# Patient Record
Sex: MALE | Race: WHITE | NOT HISPANIC OR LATINO | Employment: OTHER | URBAN - METROPOLITAN AREA
[De-identification: names, ages, dates, MRNs, and addresses within clinical notes are randomized per-mention and may not be internally consistent; named-entity substitution may affect disease eponyms.]

---

## 2017-11-18 ENCOUNTER — APPOINTMENT (OUTPATIENT)
Dept: RADIOLOGY | Facility: MEDICAL CENTER | Age: 82
DRG: 481 | End: 2017-11-18
Attending: EMERGENCY MEDICINE
Payer: MEDICARE

## 2017-11-18 ENCOUNTER — HOSPITAL ENCOUNTER (INPATIENT)
Facility: MEDICAL CENTER | Age: 82
LOS: 4 days | DRG: 481 | End: 2017-11-22
Attending: EMERGENCY MEDICINE | Admitting: HOSPITALIST
Payer: MEDICARE

## 2017-11-18 ENCOUNTER — RESOLUTE PROFESSIONAL BILLING HOSPITAL PROF FEE (OUTPATIENT)
Dept: HOSPITALIST | Facility: MEDICAL CENTER | Age: 82
End: 2017-11-18
Payer: MEDICARE

## 2017-11-18 ENCOUNTER — APPOINTMENT (OUTPATIENT)
Dept: RADIOLOGY | Facility: MEDICAL CENTER | Age: 82
DRG: 481 | End: 2017-11-18
Attending: ORTHOPAEDIC SURGERY
Payer: MEDICARE

## 2017-11-18 DIAGNOSIS — C90.00 MULTIPLE MYELOMA NOT HAVING ACHIEVED REMISSION (HCC): ICD-10-CM

## 2017-11-18 DIAGNOSIS — S72.92XA CLOSED FRACTURE OF LEFT FEMUR, UNSPECIFIED FRACTURE MORPHOLOGY, UNSPECIFIED PORTION OF FEMUR, INITIAL ENCOUNTER (HCC): ICD-10-CM

## 2017-11-18 DIAGNOSIS — Z85.79 HISTORY OF MULTIPLE MYELOMA: ICD-10-CM

## 2017-11-18 DIAGNOSIS — S72.22XA CLOSED DISPLACED SUBTROCHANTERIC FRACTURE OF LEFT FEMUR, INITIAL ENCOUNTER (HCC): ICD-10-CM

## 2017-11-18 PROBLEM — S72.90XA FEMUR FRACTURE (HCC): Status: ACTIVE | Noted: 2017-11-18

## 2017-11-18 LAB
ALBUMIN SERPL BCP-MCNC: 2.8 G/DL (ref 3.2–4.9)
ALBUMIN/GLOB SERPL: 1.5 G/DL
ALP SERPL-CCNC: 56 U/L (ref 30–99)
ALT SERPL-CCNC: 7 U/L (ref 2–50)
ANION GAP SERPL CALC-SCNC: 9 MMOL/L (ref 0–11.9)
APTT PPP: 32.9 SEC (ref 24.7–36)
AST SERPL-CCNC: 11 U/L (ref 12–45)
BASOPHILS # BLD AUTO: 0.1 % (ref 0–1.8)
BASOPHILS # BLD: 0.01 K/UL (ref 0–0.12)
BILIRUB SERPL-MCNC: 0.6 MG/DL (ref 0.1–1.5)
BUN SERPL-MCNC: 42 MG/DL (ref 8–22)
CALCIUM SERPL-MCNC: 10.3 MG/DL (ref 8.5–10.5)
CHLORIDE SERPL-SCNC: 106 MMOL/L (ref 96–112)
CO2 SERPL-SCNC: 20 MMOL/L (ref 20–33)
CREAT SERPL-MCNC: 3.62 MG/DL (ref 0.5–1.4)
EOSINOPHIL # BLD AUTO: 0 K/UL (ref 0–0.51)
EOSINOPHIL NFR BLD: 0 % (ref 0–6.9)
ERYTHROCYTE [DISTWIDTH] IN BLOOD BY AUTOMATED COUNT: 52.9 FL (ref 35.9–50)
GFR SERPL CREATININE-BSD FRML MDRD: 16 ML/MIN/1.73 M 2
GLOBULIN SER CALC-MCNC: 1.9 G/DL (ref 1.9–3.5)
GLUCOSE SERPL-MCNC: 109 MG/DL (ref 65–99)
HCT VFR BLD AUTO: 26.2 % (ref 42–52)
HGB BLD-MCNC: 9 G/DL (ref 14–18)
IMM GRANULOCYTES # BLD AUTO: 0.04 K/UL (ref 0–0.11)
IMM GRANULOCYTES NFR BLD AUTO: 0.5 % (ref 0–0.9)
LYMPHOCYTES # BLD AUTO: 1.05 K/UL (ref 1–4.8)
LYMPHOCYTES NFR BLD: 13.6 % (ref 22–41)
MCH RBC QN AUTO: 32.4 PG (ref 27–33)
MCHC RBC AUTO-ENTMCNC: 34.4 G/DL (ref 33.7–35.3)
MCV RBC AUTO: 94.2 FL (ref 81.4–97.8)
MONOCYTES # BLD AUTO: 0.22 K/UL (ref 0–0.85)
MONOCYTES NFR BLD AUTO: 2.8 % (ref 0–13.4)
NEUTROPHILS # BLD AUTO: 6.42 K/UL (ref 1.82–7.42)
NEUTROPHILS NFR BLD: 83 % (ref 44–72)
NRBC # BLD AUTO: 0 K/UL
NRBC BLD AUTO-RTO: 0 /100 WBC
PLATELET # BLD AUTO: 183 K/UL (ref 164–446)
PMV BLD AUTO: 9.2 FL (ref 9–12.9)
POTASSIUM SERPL-SCNC: 2.8 MMOL/L (ref 3.6–5.5)
PROT SERPL-MCNC: 4.7 G/DL (ref 6–8.2)
RBC # BLD AUTO: 2.78 M/UL (ref 4.7–6.1)
SODIUM SERPL-SCNC: 135 MMOL/L (ref 135–145)
WBC # BLD AUTO: 7.7 K/UL (ref 4.8–10.8)

## 2017-11-18 PROCEDURE — A9270 NON-COVERED ITEM OR SERVICE: HCPCS | Performed by: HOSPITALIST

## 2017-11-18 PROCEDURE — 99291 CRITICAL CARE FIRST HOUR: CPT

## 2017-11-18 PROCEDURE — 700105 HCHG RX REV CODE 258: Performed by: EMERGENCY MEDICINE

## 2017-11-18 PROCEDURE — 71010 DX-CHEST-PORTABLE (1 VIEW): CPT

## 2017-11-18 PROCEDURE — A4450 NON-WATERPROOF TAPE: HCPCS | Performed by: ORTHOPAEDIC SURGERY

## 2017-11-18 PROCEDURE — 502000 HCHG MISC OR IMPLANTS RC 0278: Performed by: ORTHOPAEDIC SURGERY

## 2017-11-18 PROCEDURE — A9270 NON-COVERED ITEM OR SERVICE: HCPCS

## 2017-11-18 PROCEDURE — 0QS706Z REPOSITION LEFT UPPER FEMUR WITH INTRAMEDULLARY INTERNAL FIXATION DEVICE, OPEN APPROACH: ICD-10-PCS | Performed by: ORTHOPAEDIC SURGERY

## 2017-11-18 PROCEDURE — 160029 HCHG SURGERY MINUTES - 1ST 30 MINS LEVEL 4: Performed by: ORTHOPAEDIC SURGERY

## 2017-11-18 PROCEDURE — 160036 HCHG PACU - EA ADDL 30 MINS PHASE I: Performed by: ORTHOPAEDIC SURGERY

## 2017-11-18 PROCEDURE — 700101 HCHG RX REV CODE 250

## 2017-11-18 PROCEDURE — 99223 1ST HOSP IP/OBS HIGH 75: CPT | Mod: GW,AJ | Performed by: HOSPITALIST

## 2017-11-18 PROCEDURE — 36415 COLL VENOUS BLD VENIPUNCTURE: CPT

## 2017-11-18 PROCEDURE — 160048 HCHG OR STATISTICAL LEVEL 1-5: Performed by: ORTHOPAEDIC SURGERY

## 2017-11-18 PROCEDURE — 73552 X-RAY EXAM OF FEMUR 2/>: CPT | Mod: LT

## 2017-11-18 PROCEDURE — 80053 COMPREHEN METABOLIC PANEL: CPT

## 2017-11-18 PROCEDURE — 85025 COMPLETE CBC W/AUTO DIFF WBC: CPT

## 2017-11-18 PROCEDURE — A6454 SELF-ADHER BAND W>=3" <5"/YD: HCPCS | Performed by: ORTHOPAEDIC SURGERY

## 2017-11-18 PROCEDURE — 700102 HCHG RX REV CODE 250 W/ 637 OVERRIDE(OP)

## 2017-11-18 PROCEDURE — A6402 STERILE GAUZE <= 16 SQ IN: HCPCS | Performed by: ORTHOPAEDIC SURGERY

## 2017-11-18 PROCEDURE — 700111 HCHG RX REV CODE 636 W/ 250 OVERRIDE (IP): Performed by: ORTHOPAEDIC SURGERY

## 2017-11-18 PROCEDURE — A6222 GAUZE <=16 IN NO W/SAL W/O B: HCPCS | Performed by: ORTHOPAEDIC SURGERY

## 2017-11-18 PROCEDURE — 770006 HCHG ROOM/CARE - MED/SURG/GYN SEMI*

## 2017-11-18 PROCEDURE — 96374 THER/PROPH/DIAG INJ IV PUSH: CPT

## 2017-11-18 PROCEDURE — 160002 HCHG RECOVERY MINUTES (STAT): Performed by: ORTHOPAEDIC SURGERY

## 2017-11-18 PROCEDURE — 700102 HCHG RX REV CODE 250 W/ 637 OVERRIDE(OP): Performed by: HOSPITALIST

## 2017-11-18 PROCEDURE — 500424 HCHG DRESSING, AIRSTRIP: Performed by: ORTHOPAEDIC SURGERY

## 2017-11-18 PROCEDURE — 160009 HCHG ANES TIME/MIN: Performed by: ORTHOPAEDIC SURGERY

## 2017-11-18 PROCEDURE — 700111 HCHG RX REV CODE 636 W/ 250 OVERRIDE (IP)

## 2017-11-18 PROCEDURE — 500122 HCHG BOVIE, BLADE: Performed by: ORTHOPAEDIC SURGERY

## 2017-11-18 PROCEDURE — 700111 HCHG RX REV CODE 636 W/ 250 OVERRIDE (IP): Performed by: HOSPITALIST

## 2017-11-18 PROCEDURE — 502240 HCHG MISC OR SUPPLY RC 0272: Performed by: ORTHOPAEDIC SURGERY

## 2017-11-18 PROCEDURE — 72170 X-RAY EXAM OF PELVIS: CPT

## 2017-11-18 PROCEDURE — 160041 HCHG SURGERY MINUTES - EA ADDL 1 MIN LEVEL 4: Performed by: ORTHOPAEDIC SURGERY

## 2017-11-18 PROCEDURE — 500891 HCHG PACK, ORTHO MAJOR: Performed by: ORTHOPAEDIC SURGERY

## 2017-11-18 PROCEDURE — 85730 THROMBOPLASTIN TIME PARTIAL: CPT

## 2017-11-18 PROCEDURE — 160035 HCHG PACU - 1ST 60 MINS PHASE I: Performed by: ORTHOPAEDIC SURGERY

## 2017-11-18 PROCEDURE — 700111 HCHG RX REV CODE 636 W/ 250 OVERRIDE (IP): Performed by: EMERGENCY MEDICINE

## 2017-11-18 PROCEDURE — 501838 HCHG SUTURE GENERAL: Performed by: ORTHOPAEDIC SURGERY

## 2017-11-18 DEVICE — IMPLANTABLE DEVICE: Type: IMPLANTABLE DEVICE | Status: FUNCTIONAL

## 2017-11-18 RX ORDER — DEXAMETHASONE 4 MG/1
20 TABLET ORAL
Status: DISCONTINUED | OUTPATIENT
Start: 2017-11-20 | End: 2017-11-18

## 2017-11-18 RX ORDER — HYDROXYZINE 50 MG/1
25 TABLET, FILM COATED ORAL 2 TIMES DAILY
Status: DISCONTINUED | OUTPATIENT
Start: 2017-11-18 | End: 2017-11-22 | Stop reason: HOSPADM

## 2017-11-18 RX ORDER — OMEPRAZOLE 20 MG/1
20 CAPSULE, DELAYED RELEASE ORAL DAILY
Status: DISCONTINUED | OUTPATIENT
Start: 2017-11-18 | End: 2017-11-22 | Stop reason: HOSPADM

## 2017-11-18 RX ORDER — AMOXICILLIN 250 MG
2 CAPSULE ORAL 2 TIMES DAILY
Status: DISCONTINUED | OUTPATIENT
Start: 2017-11-19 | End: 2017-11-22 | Stop reason: HOSPADM

## 2017-11-18 RX ORDER — HYDROXYZINE HYDROCHLORIDE 25 MG/1
25 TABLET, FILM COATED ORAL 2 TIMES DAILY
COMMUNITY

## 2017-11-18 RX ORDER — OMEPRAZOLE 20 MG/1
20 CAPSULE, DELAYED RELEASE ORAL DAILY
COMMUNITY

## 2017-11-18 RX ORDER — HYDROMORPHONE HYDROCHLORIDE 2 MG/1
2 TABLET ORAL
Status: DISCONTINUED | OUTPATIENT
Start: 2017-11-18 | End: 2017-11-22 | Stop reason: HOSPADM

## 2017-11-18 RX ORDER — ONDANSETRON 4 MG/1
4 TABLET, ORALLY DISINTEGRATING ORAL EVERY 6 HOURS PRN
COMMUNITY

## 2017-11-18 RX ORDER — OXYCODONE HCL 5 MG/5 ML
SOLUTION, ORAL ORAL
Status: COMPLETED
Start: 2017-11-18 | End: 2017-11-18

## 2017-11-18 RX ORDER — ONDANSETRON 2 MG/ML
4 INJECTION INTRAMUSCULAR; INTRAVENOUS EVERY 4 HOURS PRN
Status: DISCONTINUED | OUTPATIENT
Start: 2017-11-18 | End: 2017-11-22 | Stop reason: HOSPADM

## 2017-11-18 RX ORDER — HYDROMORPHONE HYDROCHLORIDE 2 MG/1
2-4 TABLET ORAL EVERY 4 HOURS PRN
Status: ON HOLD | COMMUNITY
End: 2017-11-22

## 2017-11-18 RX ORDER — HYDROMORPHONE HYDROCHLORIDE 2 MG/ML
INJECTION, SOLUTION INTRAMUSCULAR; INTRAVENOUS; SUBCUTANEOUS
Status: COMPLETED
Start: 2017-11-18 | End: 2017-11-18

## 2017-11-18 RX ORDER — ATORVASTATIN CALCIUM 10 MG/1
10 TABLET, FILM COATED ORAL
Status: DISCONTINUED | OUTPATIENT
Start: 2017-11-18 | End: 2017-11-18

## 2017-11-18 RX ORDER — BISACODYL 10 MG
10 SUPPOSITORY, RECTAL RECTAL
Status: DISCONTINUED | OUTPATIENT
Start: 2017-11-18 | End: 2017-11-22 | Stop reason: HOSPADM

## 2017-11-18 RX ORDER — FENTANYL 25 UG/1
1 PATCH TRANSDERMAL
Status: ON HOLD | COMMUNITY
End: 2017-11-22

## 2017-11-18 RX ORDER — SIMETHICONE 80 MG
80 TABLET,CHEWABLE ORAL 2 TIMES DAILY
COMMUNITY

## 2017-11-18 RX ORDER — ONDANSETRON 4 MG/1
4 TABLET, ORALLY DISINTEGRATING ORAL EVERY 4 HOURS PRN
Status: DISCONTINUED | OUTPATIENT
Start: 2017-11-18 | End: 2017-11-22 | Stop reason: HOSPADM

## 2017-11-18 RX ORDER — POLYETHYLENE GLYCOL 3350 17 G/17G
1 POWDER, FOR SOLUTION ORAL
Status: DISCONTINUED | OUTPATIENT
Start: 2017-11-18 | End: 2017-11-22 | Stop reason: HOSPADM

## 2017-11-18 RX ORDER — SIMETHICONE 80 MG
80 TABLET,CHEWABLE ORAL 2 TIMES DAILY
Status: DISCONTINUED | OUTPATIENT
Start: 2017-11-18 | End: 2017-11-22 | Stop reason: HOSPADM

## 2017-11-18 RX ORDER — FENTANYL 25 UG/1
1 PATCH TRANSDERMAL
Status: DISCONTINUED | OUTPATIENT
Start: 2017-11-18 | End: 2017-11-22 | Stop reason: HOSPADM

## 2017-11-18 RX ORDER — SODIUM CHLORIDE 9 MG/ML
1000 INJECTION, SOLUTION INTRAVENOUS ONCE
Status: COMPLETED | OUTPATIENT
Start: 2017-11-18 | End: 2017-11-18

## 2017-11-18 RX ADMIN — FENTANYL CITRATE 12.5 MCG: 50 INJECTION, SOLUTION INTRAMUSCULAR; INTRAVENOUS at 14:51

## 2017-11-18 RX ADMIN — HYDROMORPHONE HYDROCHLORIDE 1 MG: 1 INJECTION, SOLUTION INTRAMUSCULAR; INTRAVENOUS; SUBCUTANEOUS at 12:12

## 2017-11-18 RX ADMIN — ONDANSETRON 4 MG: 2 INJECTION INTRAMUSCULAR; INTRAVENOUS at 21:13

## 2017-11-18 RX ADMIN — CEFAZOLIN SODIUM 1 G: 1 INJECTION, SOLUTION INTRAVENOUS at 21:00

## 2017-11-18 RX ADMIN — HYDROMORPHONE HYDROCHLORIDE 0.1 MG: 2 INJECTION INTRAMUSCULAR; INTRAVENOUS; SUBCUTANEOUS at 15:33

## 2017-11-18 RX ADMIN — FENTANYL CITRATE 12.5 MCG: 50 INJECTION, SOLUTION INTRAMUSCULAR; INTRAVENOUS at 14:56

## 2017-11-18 RX ADMIN — HYDROMORPHONE HYDROCHLORIDE 0.1 MG: 2 INJECTION INTRAMUSCULAR; INTRAVENOUS; SUBCUTANEOUS at 15:28

## 2017-11-18 RX ADMIN — OXYCODONE HYDROCHLORIDE 5 MG: 5 SOLUTION ORAL at 14:45

## 2017-11-18 RX ADMIN — FENTANYL CITRATE 12.5 MCG: 50 INJECTION, SOLUTION INTRAMUSCULAR; INTRAVENOUS at 15:08

## 2017-11-18 RX ADMIN — FENTANYL CITRATE 12.5 MCG: 50 INJECTION, SOLUTION INTRAMUSCULAR; INTRAVENOUS at 15:03

## 2017-11-18 RX ADMIN — HYDROMORPHONE HYDROCHLORIDE 0.1 MG: 2 INJECTION INTRAMUSCULAR; INTRAVENOUS; SUBCUTANEOUS at 15:23

## 2017-11-18 RX ADMIN — SODIUM CHLORIDE 1000 ML: 9 INJECTION, SOLUTION INTRAVENOUS at 12:12

## 2017-11-18 RX ADMIN — HYDROMORPHONE HYDROCHLORIDE 0.1 MG: 2 INJECTION INTRAMUSCULAR; INTRAVENOUS; SUBCUTANEOUS at 15:18

## 2017-11-18 RX ADMIN — HYDROMORPHONE HYDROCHLORIDE 0.1 MG: 2 INJECTION INTRAMUSCULAR; INTRAVENOUS; SUBCUTANEOUS at 15:13

## 2017-11-18 RX ADMIN — HYDROMORPHONE HYDROCHLORIDE 0.5 MG: 1 INJECTION, SOLUTION INTRAMUSCULAR; INTRAVENOUS; SUBCUTANEOUS at 16:30

## 2017-11-18 ASSESSMENT — ENCOUNTER SYMPTOMS
ABDOMINAL PAIN: 0
FEVER: 0
VOMITING: 0
COUGH: 0
TINGLING: 0
BLURRED VISION: 0
SORE THROAT: 0
DEPRESSION: 0
NECK PAIN: 0
SHORTNESS OF BREATH: 0
PALPITATIONS: 0
BACK PAIN: 0
DIZZINESS: 0
NAUSEA: 0
HEADACHES: 0
EYE PAIN: 0
INSOMNIA: 0
CHILLS: 0

## 2017-11-18 ASSESSMENT — PAIN SCALES - GENERAL
PAINLEVEL_OUTOF10: 6
PAINLEVEL_OUTOF10: 8
PAINLEVEL_OUTOF10: 0
PAINLEVEL_OUTOF10: 5
PAINLEVEL_OUTOF10: 8
PAINLEVEL_OUTOF10: 0
PAINLEVEL_OUTOF10: 8
PAINLEVEL_OUTOF10: 0
PAINLEVEL_OUTOF10: 7
PAINLEVEL_OUTOF10: 8

## 2017-11-18 ASSESSMENT — LIFESTYLE VARIABLES
ALCOHOL_USE: NO
EVER_SMOKED: YES

## 2017-11-18 ASSESSMENT — COPD QUESTIONNAIRES
DURING THE PAST 4 WEEKS HOW MUCH DID YOU FEEL SHORT OF BREATH: SOME OF THE TIME
COPD SCREENING SCORE: 7
DO YOU EVER COUGH UP ANY MUCUS OR PHLEGM?: YES, EVERY DAY
HAVE YOU SMOKED AT LEAST 100 CIGARETTES IN YOUR ENTIRE LIFE: YES

## 2017-11-18 NOTE — OP REPORT
DATE OF SERVICE:  11/18/2017    PREOPERATIVE DIAGNOSES:  1.  Left pathologic subtrochanteric femur fracture, displaced.  2.  Multiple myeloma.    POSTOPERATIVE DIAGNOSES:  1.  Left pathologic subtrochanteric femur fracture, displaced.  2.  Multiple myeloma.    PROCEDURES PERFORMED:  Open treatment with intramedullary nailing, left   subtrochanteric femur fracture.    SURGEON:  Pravin Henderson MD    ANESTHESIOLOGIST:  Christel Jones MD    ANESTHESIA:  General.    ASSISTANT:  Manoj Rodríguez PA-C    ESTIMATED BLOOD LOSS:  50 mL.    IMPLANTS:  Synthes 11x420 mm TFNA with a 90 mm helical blade and one 5.0 mm   distal interlocking screw.    INDICATION FOR PROCEDURE:  The patient is an 83-year-old male on hospice with   multiple myeloma.  He sustained a pathologic fracture to the left   subtrochanteric femur through a large lytic lesion in that area.  He was   brought to [facility] for further evaluation, admitted to the hospitalist service.    He was felt to be medically optimized for surgical management.  I discussed   the options with the patient's family, which include comfort care versus   stabilization to improve his quality of life, and he and his family elected to   have surgery.    DESCRIPTION OF PROCEDURE:  The patient was met in the preop holding area and   his surgical site was signed.  His consent was confirmed for accuracy.  He was   taken back to the operating room and general anesthesia was induced.  Ancef   was administered.  He was positioned on the fracture table in supine position.    Left lower extremity was placed in traction and slight external rotation   given the fracture deformity.  The right lower extremity was just slightly   extended in traction boot, but no traction was applied.  Fluoroscopic imaging   confirmed much improved alignment with longitudinal traction and rotation   before prepping and draping.  The left thigh was then prepped and draped in   the usual sterile fashion.  A  formal timeout was performed to confirm   patient's correct name, correct surgical site, correct procedure and correct   laterality.  A percutaneous starting point at the tip of the greater   trochanter was obtained with a guidepin that was inserted into the proximal   femur.  An incision was made then over the guidepin and the femoral entry   reamer was used in the tip of the greater trochanter.  Once I obtained an   entry portal into the femur, I placed a bend on the ball-tipped guide nohemy and   inserted to an appropriate position in the distal femur.  I then sequentially   reamed past the isthmus with a 12 mm reamer, which had good cortical chatter   and selected an 11x420 mm TFNA, 130 degrees and impacted it to the appropriate   depth, placed the guidepin through the aiming arm for the helical blade and   then appropriate tip to apex distance and prepared for and inserted 90 mm   helical blade and locked the set screw proximally given that it was a   transverse subtrochanteric fracture and did not require dynamic compression.    I then forward slapped the nail to obtain better bony contact at the   subtrochanteric region.  There was some slight medial to lateral translation,   but good evidence of acceptable rotational alignment and good restoration of   his mechanical axis.  I then placed one lateral to medial interlocking screw   using perfect [place] technique distally.  Final fluoroscopic imaging confirmed   overall acceptable alignment of the fracture and acceptable position of the   implants.  I did not send femoral reaming as he has a well known underlying   diagnosis consistent with multiple myeloma for which he is on hospice.  The   wounds were thoroughly irrigated.  Layered closures were performed with 0   Vicryl, 2-0 Vicryl, and the skin edges with staples.  The wounds were   thoroughly cleansed and dried and sterile dressings were applied.  He was then   taken out of traction, transferred on the [facility]  and taken to postanesthesia   care unit in stable condition.    Manoj Rodríguez PA-C, was present for the duration of the procedure and assisted   with patient positioning, implant insertion and wound closure.    PLAN:  1.  The patient will be readmitted to medical service postop.  2.  He will be weightbearing as tolerated to the left lower extremity.  3.  He will need Ancef 2 doses of postop for routine infection prophylaxis.  4.  He should work with physical and occupational therapy for mobilization.  5.  He should continue SCDs and I will leave the determination for whether he   needs anticoagulation prophylaxis up to the hospitalist service.       ____________________________________     MD TOBIN Bowles / LINDSAY    DD:  11/18/2017 14:45:52  DT:  11/18/2017 15:02:26    D#:  [id number]  Job#:  [id number]

## 2017-11-18 NOTE — ED NOTES
Bilateral hearing aids removed from pt's ears, placed in blue cup with pt sticker on it and given to wife,  Home sheet placed in bag given to wife, PT ORIGINAL DNR paper given to wife

## 2017-11-18 NOTE — H&P
" Hospital Medicine History and Physical    Date of Service  11/18/2017    Chief Complaint  Chief Complaint   Patient presents with   • Dislocation Hip       History of Presenting Illness  83 y.o. male who presented 11/18/2017 with leg pain after turning in bed. He has a history of multiple myeloma and is currently on hospice for thiswith [place] of life.  He has a history of a pathalogic fracture of his right leg in the past and has been found to have a femur fracture on the left. He will undergo a repair for palliation of pain.     Primary Care Physician  Mindy Jackson D.O.    Consultants  ortho    Code Status  DNR    Review of Systems  Review of Systems   Constitutional: Positive for malaise/fatigue. Negative for chills and fever.   HENT: Negative for sore throat.    Eyes: Negative for blurred vision and pain.   Respiratory: Negative for cough and shortness of breath.    Cardiovascular: Negative for chest pain and palpitations.   Gastrointestinal: Negative for abdominal pain, nausea and vomiting.   Genitourinary: Negative for dysuria and urgency.   Musculoskeletal: Positive for joint pain. Negative for back pain and neck pain.   Skin: Negative for itching and rash.   Neurological: Negative for dizziness, tingling and headaches.   Psychiatric/Behavioral: Negative for depression. The patient does not have insomnia.    All other systems reviewed and are negative.       Past Medical History  Past Medical History:   Diagnosis Date   • Cancer (CMS-HCC) 2016    multiple myeloma, Chemo started 1/25/16   • Femur fracture (CMS-HCC) 12/2015    r/t myeloma    • Anemia 1997    secondary to GIB    • Bronchitis     hx   • Fall     last summer from fainting episode   • GERD (gastroesophageal reflux disease)    • Hemorrhagic disorder (CMS-HCC)     esophageal tear from gerd   • High cholesterol    • [place] (hard of hearing)    • Hypercholesteremia    • Hypertension     denies medication at this time, pt reports BP is \"normal\"   • " Indigestion    • Pacemaker placed in 1999    removed 2008   • Renal disorder     kidney stones   • Snoring    • Syncope     when he gets dehydrated, has caffeine or around needles   • Urinary bladder disorder        Surgical History  Past Surgical History:   Procedure Laterality Date   • GASTROSCOPY-ENDO  3/4/2016    Procedure: GASTROSCOPY-ENDO;  Surgeon: Willis Lackey M.D.;  Location: ENDOSCOPY [facility];  Service:    • GASTROSCOPY WITH BIOPSY  3/4/2016    Procedure: GASTROSCOPY WITH BIOPSY;  Surgeon: Willis Lackey M.D.;  Location: ENDOSCOPY [facility];  Service:    • CYSTOSCOPY N/A 1/29/2016    Procedure: CYSTOSCOPY FOR: CYSTOLITHOLAPAXY;  Surgeon: Marvel Sheffield M.D.;  Location: SURGERY [facility];  Service:    • LASERTRIPSY N/A 1/29/2016    Procedure: LASERTRIPSY LITHO;  Surgeon: Marvel Sheffield M.D.;  Location: SURGERY [facility];  Service:    • OTHER ORTHOPEDIC SURGERY  12/2015    Femur and hip repair ([facility])    • OTHER      bone marrow biopsy 12/29/16   • PACEMAKER INSERTION     • PACEMAKER REVISION         Medications  No current facility-administered medications on file prior to encounter.      Current Outpatient Prescriptions on File Prior to Encounter   Medication Sig Dispense Refill   • furosemide (LASIX) 20 MG Tab Take 20 mg by mouth 2 times a day. Indications: Edema     • potassium chloride (KLOR-CON) 20 MEQ Pack Take 20 mEq by mouth every day.     • vitamin D (CHOLECALCIFEROL) 1000 UNIT Tab Take 1,000 Units by mouth every day.     • dexamethasone (DECADRON) 4 MG Tab Take 20 mg by mouth every Monday.     • Omega-3 Fatty Acids (SALMON OIL-1000 PO) Take 1,000 mg by mouth every day.     • finasteride (PROSCAR) 5 MG Tab Take 5 mg by mouth every day.     • bortezomib in syringe Inject 1.3 mg/m2 as instructed Once. 2.6 mg subcutaneous Mondays and Thursdays.     • atorvastatin (LIPITOR) 10 MG TABS Take 10 mg by mouth every bedtime.         Family History  History reviewed.  "No pertinent family history.    Social History  Social History   Substance Use Topics   • Smoking status: Never Smoker   • Smokeless tobacco: Never Used   • Alcohol use Yes      Comment: 3-4 drinks week       Allergies  Allergies   Allergen Reactions   • Valacyclovir Hcl Nausea     \"off balance\" (only generic medication)         Physical Exam  Laboratory   Hemodynamics  Temp (24hrs), Av.5 °C (97.7 °F), Min:36.5 °C (97.7 °F), Max:36.5 °C (97.7 °F)   Temperature: 36.5 °C (97.7 °F)  Pulse  Av.7  Min: 60  Max: 78 Heart Rate (Monitored): 77  Blood Pressure : 115/68, NIBP: 106/70      Respiratory      Respiration: 16, Pulse Oximetry: 91 %             Physical Exam   Constitutional: He is oriented to person, place, and time. He appears well-developed and well-nourished. No distress.   Ill appearing, hard of hearing.    HENT:   Right Ear: External ear normal.   Left Ear: External ear normal.   Nose: Nose normal.   Eyes: Right eye exhibits no discharge. Left eye exhibits no discharge. No scleral icterus.   Neck: No JVD present. No tracheal deviation present.   Cardiovascular: Normal rate, normal heart sounds and intact distal pulses.    No murmur heard.  Pulmonary/Chest: Effort normal and breath sounds normal. No respiratory distress. He has no wheezes. He has no rales.   Abdominal: Soft. Bowel sounds are normal. He exhibits no distension. There is no tenderness. There is no guarding.   Musculoskeletal: He exhibits no edema or tenderness.   Markedly deranged left leg with angulation of the femur and internal rotation.    Neurological: He is alert and oriented to person, place, and time.   Skin: Skin is warm and dry. He is not diaphoretic. No erythema. There is pallor.   Psychiatric: He has a normal mood and affect. His behavior is normal.   Nursing note and vitals reviewed.              No results for input(s): ALTSGPT, ASTSGOT, ALKPHOSPHAT, TBILIRUBIN, DBILIRUBIN, GAMMAGT, AMYLASE, LIPASE, ALB, PREALBUMIN, GLUCOSE " in the last 72 hours.              Lab Results   Component Value Date    TROPONINI <0.01 09/28/2012     Urinalysis:  No results found for: SPECGRAVITY, GLUCOSEUR, KETONES, NITRITE, WBCURINE, RBCURINE, BACTERIA, EPITHELCELL     Imaging  Xr femur:1.  Fracture the left proximal femur in a subtrochanteric location. This is likely pathologic in nature with a lytic lesion seen at that location.    2.  Multiple other lytic lesions seen scattered throughout the femur and adjacent pelvis consistent with either multiple myeloma or metastatic disease.     Assessment/Plan     I anticipate this patient will require at least two midnights for appropriate medical management, necessitating inpatient admission.    Femur fracture (CMS-[facility])   Assessment & Plan    Will proceed with repair with ortho for palliation. Pain control. No dvt proph given on hospice.         CKD III- (present on admission)   Assessment & Plan    Will not trend given on hospice.         Multiple myeloma (CMS-[facility])- (present on admission)   Assessment & Plan    End stage on hospice with [place] of life.   Manage sx only.             VTE prophylaxis: none- on hospice.

## 2017-11-18 NOTE — PROGRESS NOTES
Planning IMN of left displaced, pathologic subtrochanteric femur fracture.  See full dictated consult for further details.

## 2017-11-18 NOTE — ED NOTES
Unable to complete med rec at this time, family heading home to get medications for med rec, family has the names written down but no strengths or directions

## 2017-11-18 NOTE — ED PROVIDER NOTES
ED Provider Note    ED Provider Note    Scribed for Elaine Leahy D.O. by Elaine Leahy. 11/18/2017, 8:36 AM.    Primary care provider: Mindy Jackson D.O.  Means of arrival: EMS  History obtained from: Patient, wife  History limited by: None    CHIEF COMPLAINT  Chief Complaint   Patient presents with   • Dislocation Hip       HPI  Silviano Peterson is a 83 y.o. male who [facility] with a chief complaint of left hip pain. Patient is a hospice patient. He is a DNR. He has a history of multiple myeloma. Apparently, the physical therapist was at the house, she rolled him over because he was having left-sided hip pain. They heard a pop and were concerned about a possible dislocation. No reported fever. He is otherwise been in his normal state of health.    REVIEW OF SYSTEMS  Review of Systems   Constitutional: Negative for fever.   Respiratory: Negative for shortness of breath.    Cardiovascular: Negative for chest pain.   Gastrointestinal: Negative for vomiting.   Musculoskeletal: Positive for joint pain.   Neurological: Negative for headaches.   All other systems reviewed and are negative.      PAST MEDICAL HISTORY   has a past medical history of Anemia (1997); Bronchitis; Cancer (CMS-[facility]) (2016); Fall; Femur fracture (CMS-HCC) (12/2015); GERD (gastroesophageal reflux disease); Hemorrhagic disorder (CMS-HCC); High cholesterol; [place] (hard of hearing); Hypercholesteremia; Hypertension; Indigestion; Pacemaker (placed in 1999); Renal disorder; Snoring; Syncope; and Urinary bladder disorder.    SURGICAL HISTORY   has a past surgical history that includes pacemaker insertion; pacemaker revision; other orthopedic surgery (12/2015); other; cystoscopy (N/A, 1/29/2016); lasertripsy (N/A, 1/29/2016); gastroscopy-endo (3/4/2016); and gastroscopy with biopsy (3/4/2016).    SOCIAL HISTORY  Social History   Substance Use Topics   • Smoking status: Never Smoker   • Smokeless tobacco: Never Used  "  • Alcohol use Yes      Comment: 3-4 drinks week      History   Drug Use No       FAMILY HISTORY  History reviewed. No pertinent family history.    CURRENT MEDICATIONS  Home Medications     Reviewed by Sudheer Packer (Pharmacy Tech) on 11/18/17 at 1317  Med List Status: Complete   Medication Last Dose Status   fentanyl (DURAGESIC) 25 MCG/HR PATCH 72 HR 11/18/2017 Active   HYDROmorphone (DILAUDID) 2 MG Tab 11/18/2017 Active   hydrOXYzine HCl (ATARAX) 25 MG Tab 11/17/2017 Active   NON SPECIFIED PRN Active   omeprazole (PRILOSEC) 20 MG delayed-release capsule 11/17/2017 Active   ondansetron (ZOFRAN ODT) 4 MG TABLET DISPERSIBLE 11/17/2017 Active   simethicone (MYLICON) 80 MG Chew Tab 11/17/2017 Active                ALLERGIES  Allergies   Allergen Reactions   • Valacyclovir Hcl Nausea     \"off balance\" (only generic medication)        PHYSICAL EXAM  VITAL SIGNS: /80   Pulse 84   Temp 36.4 °C (97.6 °F)   Resp 16   Ht 1.778 m (5' 10\")   Wt 47.6 kg (105 lb)   SpO2 99%   BMI 15.07 kg/m²   Vitals reviewed.  Constitutional: Patient is oriented to person, place, and time. Patient's thin. He is chronically ill-appearing . Mild distress.    Head: Normocephalic and atraumatic.   Ears: Normal external ears bilaterally.   Mouth/Throat: Oropharynx is clear, with dry mucous membranes  Eyes: Conjunctivae are normal. Pupils are equal, round, and reactive to light.   Neck: Normal range of motion. Neck supple.  Cardiovascular: Normal rate, regular rhythm and normal heart sounds. Normal peripheral pulses bilateral lower extremities.  Pulmonary/Chest: Effort normal and breath sounds normal. No respiratory distress, no wheezes, rhonchi, or rales.  Abdominal: Soft. Bowel sounds are normal. There is no tenderness, rebound or guarding, or peritoneal signs  Musculoskeletal: No edema. Lower extremity is rotated internally from the proximal hip. Is tenderness over the left hip.   Neurological: No focal deficits.   Skin: " Skin is warm and dry. No erythema. No pallor.   Psychiatric: Patient has a normal mood and affect.     LABS  Results for orders placed or performed during the hospital encounter of 11/18/17   CBC WITH DIFFERENTIAL   Result Value Ref Range    WBC 7.7 4.8 - 10.8 K/uL    RBC 2.78 (L) 4.70 - 6.10 M/uL    Hemoglobin 9.0 (L) 14.0 - 18.0 g/dL    Hematocrit 26.2 (L) 42.0 - 52.0 %    MCV 94.2 81.4 - 97.8 fL    MCH 32.4 27.0 - 33.0 pg    MCHC 34.4 33.7 - 35.3 g/dL    RDW 52.9 (H) 35.9 - 50.0 fL    Platelet Count 183 164 - 446 K/uL    MPV 9.2 9.0 - 12.9 fL    Neutrophils-Polys 83.00 (H) 44.00 - 72.00 %    Lymphocytes 13.60 (L) 22.00 - 41.00 %    Monocytes 2.80 0.00 - 13.40 %    Eosinophils 0.00 0.00 - 6.90 %    Basophils 0.10 0.00 - 1.80 %    Immature Granulocytes 0.50 0.00 - 0.90 %    Nucleated RBC 0.00 /100 WBC    Neutrophils (Absolute) 6.42 1.82 - 7.42 K/uL    Lymphs (Absolute) 1.05 1.00 - 4.80 K/uL    Monos (Absolute) 0.22 0.00 - 0.85 K/uL    Eos (Absolute) 0.00 0.00 - 0.51 K/uL    Baso (Absolute) 0.01 0.00 - 0.12 K/uL    Immature Granulocytes (abs) 0.04 0.00 - 0.11 K/uL    NRBC (Absolute) 0.00 K/uL   COMP METABOLIC PANEL   Result Value Ref Range    Potassium 2.8 (L) 3.6 - 5.5 mmol/L    Sodium 135 135 - 145 mmol/L    Chloride 106 96 - 112 mmol/L    Co2 20 20 - 33 mmol/L    Anion Gap 9.0 0.0 - 11.9    Glucose 109 (H) 65 - 99 mg/dL    Bun 42 (H) 8 - 22 mg/dL    Creatinine 3.62 (H) 0.50 - 1.40 mg/dL    Calcium 10.3 8.5 - 10.5 mg/dL    AST(SGOT) 11 (L) 12 - 45 U/L    ALT(SGPT) 7 2 - 50 U/L    Alkaline Phosphatase 56 30 - 99 U/L    Total Bilirubin 0.6 0.1 - 1.5 mg/dL    Albumin 2.8 (L) 3.2 - 4.9 g/dL    Total Protein 4.7 (L) 6.0 - 8.2 g/dL    Globulin 1.9 1.9 - 3.5 g/dL    A-G Ratio 1.5 g/dL   APTT   Result Value Ref Range    APTT 32.9 24.7 - 36.0 sec   ESTIMATED GFR   Result Value Ref Range    GFR If  20 (A) >60 mL/min/1.73 m 2    GFR If Non  16 (A) >60 mL/min/1.73 m 2       All labs reviewed by  me.      RADIOLOGY  DX-FEMUR-2+ LEFT   Final Result      1.  Fracture the left proximal femur in a subtrochanteric location. This is likely pathologic in nature with a lytic lesion seen at that location.      2.  Multiple other lytic lesions seen scattered throughout the femur and adjacent pelvis consistent with either multiple myeloma or metastatic disease.      DX-PELVIS-1 OR 2 VIEWS   Final Result      1.  Left proximal femoral subtrochanteric fracture.      2.  Possible multiple small lytic lesions scattered throughout the osseous structures to include the left proximal femur which may indicate presence of pathologic fracture.      3.  Severe osteopenia.      4.  Prior right hip arthroplasty.      DX-CHEST-PORTABLE (1 VIEW)   Final Result      1.  Patchy bilateral atelectasis. Cardiomegaly.      2.  Multiple right rib fractures likely chronic in nature.      DX-FEMUR-2+ LEFT    (Results Pending)   DX-PORTABLE FLUORO > 1 HOUR    (Results Pending)     The radiologist's interpretation of all radiological studies have been reviewed by me.    COURSE & MEDICAL DECISION MAKING  Pertinent Labs & Imaging studies reviewed. (See chart for details)    8:36 AM - Patient seen and examined at bedside. Patient's resting supine. He complains of left hip pain. He is dry mucous membranes. I reviewed the x-rays and notified them that he has a proximal fracture. Will discuss with orthopedics regarding palliative measures versus operative repair for comfort. Patient will be treated with IV fluids and pain medication. Ordered labs preoperatively.    The differential diagnoses include but are not limited to: Hip fracture versus dislocation    10:36 AM Dr. Henderson, orthopedics, aware    11:06 AM Discussed with Dr. Roberts who agrees to admit the patient to their service. He is aware, that Dr. Henderson orthopedics plans to take the patient to the operating room later today. The hospice patient and DNR with a history of multiple myeloma  but given the morphology of the fracture, both family and patient agreed that for comfort, OR was best management.    Labs reviewed, patient has anemia with hemoglobin of 9 and it hypokalemia with potassium of 2.8. Per review of previous records, show patient's baseline hemoglobin is 9-10. I've ordered for potassium replacement.     Patient will be admitted in guarded condition.    FINAL IMPRESSION  1. Closed fracture of left femur, unspecified fracture morphology, unspecified portion of femur, initial encounter (CMS-[facility])    2. History of multiple myeloma

## 2017-11-18 NOTE — ED NOTES
"PT BIB EMS per report pt was being cleaned by family in the bed and turned pt, a \"pop\" was heard.  PT's left leg has a significant deformity at this time, CMS intact.  PT awake and alert.  PT is currently on hospice for multiple myeloma and arrives with a [facility] issued DNR paper.  Family at bedside   Chief Complaint   Patient presents with   • Dislocation Hip     Blood pressure 115/68, pulse 60, temperature 36.5 °C (97.7 °F), resp. rate 16, height 1.778 m (5' 10\"), weight 47.6 kg (105 lb).      "

## 2017-11-18 NOTE — ED NOTES
Med rec complete per medication bottles from family (returned)  Allergies reviewed    Patient had started Gabapentin this week and has D/C'd

## 2017-11-18 NOTE — CONSULTS
DATE OF SERVICE:  11/18/2017    REQUESTING PHYSICIAN:  Elaine Leahy DO, emergency department.    REASON FOR CONSULTATION:  Left femur fracture.    CHIEF COMPLAINT:  Left thigh pain.    HISTORY OF PRESENT ILLNESS:  The patient is an 83-year-old male.  He has a   diagnosis of multiple myeloma and he is on hospice for this.  He is here with   his family including his wife.  They state that he was trying to roll over in   bed and essentially felt to pop in his thigh, had significant pain, was   transferred to [facility] for further evaluation.  He was found to have a left   subtrochanteric femur fracture through a lytic lesion consistent with   pathologic fracture.  I was consulted by Dr. Leahy for further evaluation and   treatment recommendations.    ALLERGIES:  VALACYCLOVIR.    OUTPATIENT MEDICATIONS:  Unknown as family is trying to get medical   reconciliation from home.    PAST MEDICAL DIAGNOSES:  Kidney stones, pacemaker placement in 1998, removed   in 2008, hypertension, high cholesterol, hard of hearing, history of   esophageal tear from gastroesophageal reflux disease, history of right femur   fracture in 2015, treated with a cemented hemiarthroplasty related to multiple   myeloma, multiple myeloma, on chemotherapy since January 2016, but is now   currently on hospice, and history of anemia as well secondary to   gastrointestinal bleed.    PAST SURGICAL HISTORY:  1.  Pacemaker insertion and revision.  2.  Bone marrow biopsy in 2016.  3.  Right hip hemiarthroplasty in 2015 at [facility].    SOCIAL HISTORY:  Patient is a nonsmoker.  He had a history of drinking 3-4   alcoholic beverages daily.  He is currently on hospice.  Multiple family   members are here with him at bedside.    REVIEW OF SYSTEMS:  Patient is hard of hearing, but denies chest pain, nausea,   vomiting, or shortness of breath.  Otherwise, normal per AMA criteria other   than already stated in the HPI.    PHYSICAL EXAMINATION:  VITAL  SIGNS:  Temperature 97.6, heart rate 84, respiratory rate is 16, blood   pressure 121/80, pulse oximetry 99% on room air.  GENERAL APPEARANCE:  Patient is alert.  He is oriented.  He is in no acute   distress.  He is very hard of hearing.  HEAD, EYES, EARS, NOSE, AND THROAT:  He is very thin and slightly cachectic   appearing.  PULMONARY:  Symmetric, unlabored breathing.  CARDIOVASCULAR:  Extremities are well perfused.  ABDOMEN:  Nondistended, thin.  MUSCULOSKELETAL:  Left lower extremity is internally rotated, shortened in his   knee and hip are flexed.  He is able to dorsi and plantarflex his foot and   flex and extend his toes.  He has sensation intact to light touch in the   sural, saphenous, deep peroneal, superficial peroneal, and tibial nerve   distributions with palpable dorsalis pedis pulse.  There are no obvious wounds   present in the left lower extremity.    RADIOGRAPHIC DATA:  AP pelvis and 2 views of the left femur show evidence of a   relatively transverse subtrochanteric femur fracture through a lytic lesion   with multiple other areas of smaller lytic lesions.  He has a history of right   cemented hemiarthroplasty with a long stem.    ASSESSMENT:  An 83-year-old male with multiple myeloma, on hospice with a   pathologic left subtrochanteric femur fracture through a lytic lesion.    RECOMMENDATIONS:  1.  I discussed these findings with the patient and his family in detail.  We   discussed treatment options including comfort care and continue hospice with   medications and we discussed potential downside of this as far as potential   issues with maximizing his quality of life due to pain.  The alternative would   be surgical fixation with intramedullary nail to reduce this fracture,   stabilize it, and to protect the remainder of his femur from any further   pathologic fractures related to underlying myeloma.  We discussed risks of   surgery including blood loss requiring transfusion and receiving  general   anesthesia including heart attack, stroke and death, and potential loss of   fixation due to poor bone quality.  His family expressed understanding and he   and his family wished to proceed with surgical management for fixation of this   fracture to maximize his remaining quality of life.  2.  Patient is currently n.p.o.  We will try to make preparations again in the   operating room.  Today, he is felt to be medically optimized from the   hospitalist service.       ____________________________________     MD TOBIN Bowles / LINDSAY    DD:  11/18/2017 12:43:30  DT:  11/18/2017 13:19:58    D#:  [id number]  Job#:  [id number]

## 2017-11-18 NOTE — OR SURGEON
Immediate Post OP Note    PreOp Diagnosis: Left pathologic subtrochanteric femur fx    PostOp Diagnosis: same    Procedure(s):  FEMUR NAILING INTRAMEDULLARY - Wound Class: Clean    Surgeon(s):  Pravin Henderson M.D.    Anesthesiologist/Type of Anesthesia:  Anesthesiologist: Christel Jones M.D./General    Surgical Staff:  Circulator: Te Anglin R.N.  Scrub Person: Fredy Correa  Radiology Technologist: Wicho Mendoza    Specimens: none    Estimated Blood Loss: 50cc    Findings: see dictation    Complications: none known    PLAN:  --WBAT LLE  --ancef x 2 doses postop  --PT/OT for mobilization  --continue SCDs, okay for lovenox if indicated tomorrow per hospitalist service          11/18/2017 2:34 PM Pravin Henderson

## 2017-11-19 PROCEDURE — G8978 MOBILITY CURRENT STATUS: HCPCS | Mod: CJ

## 2017-11-19 PROCEDURE — 700111 HCHG RX REV CODE 636 W/ 250 OVERRIDE (IP): Performed by: ORTHOPAEDIC SURGERY

## 2017-11-19 PROCEDURE — G8979 MOBILITY GOAL STATUS: HCPCS | Mod: CI

## 2017-11-19 PROCEDURE — 700102 HCHG RX REV CODE 250 W/ 637 OVERRIDE(OP): Performed by: HOSPITALIST

## 2017-11-19 PROCEDURE — 99232 SBSQ HOSP IP/OBS MODERATE 35: CPT | Mod: GW | Performed by: HOSPITALIST

## 2017-11-19 PROCEDURE — 97162 PT EVAL MOD COMPLEX 30 MIN: CPT

## 2017-11-19 PROCEDURE — G8987 SELF CARE CURRENT STATUS: HCPCS | Mod: CL

## 2017-11-19 PROCEDURE — A9270 NON-COVERED ITEM OR SERVICE: HCPCS | Performed by: HOSPITALIST

## 2017-11-19 PROCEDURE — 770001 HCHG ROOM/CARE - MED/SURG/GYN PRIV*

## 2017-11-19 PROCEDURE — 97165 OT EVAL LOW COMPLEX 30 MIN: CPT

## 2017-11-19 PROCEDURE — G8988 SELF CARE GOAL STATUS: HCPCS | Mod: CK

## 2017-11-19 RX ADMIN — HYDROXYZINE HYDROCHLORIDE 25 MG: 50 TABLET, FILM COATED ORAL at 21:45

## 2017-11-19 RX ADMIN — OMEPRAZOLE 20 MG: 20 CAPSULE, DELAYED RELEASE ORAL at 08:17

## 2017-11-19 RX ADMIN — SIMETHICONE CHEW TAB 80 MG 80 MG: 80 TABLET ORAL at 21:45

## 2017-11-19 RX ADMIN — STANDARDIZED SENNA CONCENTRATE AND DOCUSATE SODIUM 2 TABLET: 8.6; 5 TABLET, FILM COATED ORAL at 08:17

## 2017-11-19 RX ADMIN — SIMETHICONE CHEW TAB 80 MG 80 MG: 80 TABLET ORAL at 08:17

## 2017-11-19 RX ADMIN — HYDROXYZINE HYDROCHLORIDE 25 MG: 50 TABLET, FILM COATED ORAL at 08:17

## 2017-11-19 RX ADMIN — CEFAZOLIN SODIUM 1 G: 1 INJECTION, SOLUTION INTRAVENOUS at 05:28

## 2017-11-19 ASSESSMENT — ENCOUNTER SYMPTOMS
TINGLING: 0
HEADACHES: 0
CONSTIPATION: 0
NAUSEA: 0
HEMOPTYSIS: 0
WEAKNESS: 1
BLURRED VISION: 0
MEMORY LOSS: 0
COUGH: 0
PALPITATIONS: 0
TREMORS: 0
SPUTUM PRODUCTION: 0
CHILLS: 0
ORTHOPNEA: 0
BLOOD IN STOOL: 0
BACK PAIN: 0
SPEECH CHANGE: 0
PHOTOPHOBIA: 0
SENSORY CHANGE: 0
NERVOUS/ANXIOUS: 0
EYE PAIN: 0
DEPRESSION: 0
CLAUDICATION: 0
NECK PAIN: 0
DIZZINESS: 0
HEARTBURN: 0
DOUBLE VISION: 0
SORE THROAT: 0
PND: 0
STRIDOR: 0
VOMITING: 0
MYALGIAS: 1
FEVER: 0
SHORTNESS OF BREATH: 0

## 2017-11-19 ASSESSMENT — PAIN SCALES - GENERAL
PAINLEVEL_OUTOF10: 0

## 2017-11-19 ASSESSMENT — COGNITIVE AND FUNCTIONAL STATUS - GENERAL
MOVING FROM LYING ON BACK TO SITTING ON SIDE OF FLAT BED: A LOT
PERSONAL GROOMING: A LITTLE
DRESSING REGULAR UPPER BODY CLOTHING: A LOT
MOVING TO AND FROM BED TO CHAIR: A LITTLE
CLIMB 3 TO 5 STEPS WITH RAILING: TOTAL
DAILY ACTIVITIY SCORE: 13
TOILETING: TOTAL
WALKING IN HOSPITAL ROOM: TOTAL
TURNING FROM BACK TO SIDE WHILE IN FLAT BAD: A LITTLE
MOBILITY SCORE: 12
DRESSING REGULAR LOWER BODY CLOTHING: A LOT
SUGGESTED CMS G CODE MODIFIER MOBILITY: CL
HELP NEEDED FOR BATHING: A LOT
SUGGESTED CMS G CODE MODIFIER DAILY ACTIVITY: CL
STANDING UP FROM CHAIR USING ARMS: A LOT
EATING MEALS: A LITTLE

## 2017-11-19 ASSESSMENT — ACTIVITIES OF DAILY LIVING (ADL): TOILETING: REQUIRES ASSIST

## 2017-11-19 ASSESSMENT — GAIT ASSESSMENTS: GAIT LEVEL OF ASSIST: UNABLE TO PARTICIPATE

## 2017-11-19 NOTE — CARE PLAN
Problem: Pain Management  Goal: Pain level will decrease to patient's comfort goal  Outcome: PROGRESSING AS EXPECTED  Pt reports 0/10 pain. Pt is sleeping comfortably.     Problem: Communication  Goal: The ability to communicate needs accurately and effectively will improve  Outcome: PROGRESSING AS EXPECTED   Pt calls for help appropriately on call light. Call light with in reach. Hourly rounding in place. Using writing to communicate and slow loud talking.

## 2017-11-19 NOTE — PROGRESS NOTES
Assumed care of pt 1845. Pt A&Ox4. Pt resting in bed.  Reviewed POC. Call light within reach. Pt is very hard of hearing and hearing devices not available as wife has them. Attempted to write to communicate with pt. Pt states no pain present. Pt requested some juice. Pt became nauseas after drinking juice and was given Zofran. Pt declined to take medications due to nausea. Pt is lethargic and sleeping. Pt appears comfortable at this time. Pt calls appropriately. Hourly rounding in place. Pt has no needs or concerns at this time.

## 2017-11-19 NOTE — PROGRESS NOTES
83yoM with left pathologic subtrochanteric femur fracture related to myeloma s/p IMN today.  On hospice but treated surgically for pain control and to improve quality of life.    S: having pain postop, has fentanyl patch at baseline.      O:  Vitals:    11/18/17 1645 11/18/17 1715 11/18/17 1745 11/18/17 1815   BP: 102/72 (!) 91/63 (!) 90/54 (!) 97/67   Pulse: 98 (!) 108 (!) 101 100   Resp: 16 18 17 14   Temp: 36 °C (96.8 °F) 35.9 °C (96.6 °F) 35.8 °C (96.5 °F) (!) 35.7 °C (96.3 °F)   SpO2: 97% 97% 97% 98%   Weight:       Height:         Exam:  General-NAD, resting comfortably  LLE- +EHL/FHL/TA/GS motor, SILT in foot, palp dp pulse, thigh dressings c/d/i    A: 83yoM with left pathologic subtrochanteric femur fracture related to myeloma s/p IMN 11/18.  On hospice but treated surgically for pain control and to improve quality of life.    Recs:  --WBAT LLE  --ancef x 2 doses postop  --PT/OT for mobilization  --continue SCDs, okay for lovenox if indicated tomorrow per hospitalist service  --pain management per hospitalist service  --fu 10-14 days postop for staple removal

## 2017-11-19 NOTE — PROGRESS NOTES
[facility] Hospitalist Progress Note    Date of Service: 2017    Chief Complaint  83 y.o. male with history of multiple myeloma and is on hospice care, with history of pathological fractures of his right leg in the past, admitted 2017 with Left pathologic subtrochanteric femur fracture, displaced.    Interval Problem Update  S/p Open treatment with intramedullary nailing, left subtrochanteric femur fracture.  Pain control  PT/OT    Consultants/Specialty  Orthopaedics    Disposition  PT/OT, hospice?        Review of Systems   Constitutional: Positive for malaise/fatigue. Negative for chills and fever.   HENT: Negative for congestion, hearing loss, sore throat and tinnitus.    Eyes: Negative for blurred vision, double vision, photophobia and pain.   Respiratory: Negative for cough, hemoptysis, sputum production, shortness of breath and stridor.    Cardiovascular: Negative for chest pain, palpitations, orthopnea, claudication and PND.   Gastrointestinal: Negative for blood in stool, constipation, heartburn, melena, nausea and vomiting.   Genitourinary: Negative for dysuria, frequency and urgency.   Musculoskeletal: Positive for joint pain and myalgias. Negative for back pain and neck pain.   Neurological: Positive for weakness. Negative for dizziness, tingling, tremors, sensory change, speech change and headaches.   Psychiatric/Behavioral: Negative for depression, memory loss and suicidal ideas. The patient is not nervous/anxious.       Physical Exam  Laboratory/Imaging   Hemodynamics  Temp (24hrs), Av.2 °C (97.1 °F), Min:35.7 °C (96.3 °F), Max:36.6 °C (97.9 °F)   Temperature: 36.1 °C (96.9 °F)  Pulse  Av  Min: 60  Max: 108 Heart Rate (Monitored): (!) 106  Blood Pressure : 117/74, NIBP: 104/73      Respiratory      Respiration: 18, Pulse Oximetry: 99 %, O2 Daily Delivery Respiratory : Nasal Cannula     Work Of Breathing / Effort: Mild  RUL Breath Sounds: Diminished, RML Breath Sounds: Diminished, RLL  Breath Sounds: Diminished, JULIO Breath Sounds: Diminished, LLL Breath Sounds: Diminished    Fluids    Intake/Output Summary (Last 24 hours) at 11/19/17 0816  Last data filed at 11/19/17 0432   Gross per 24 hour   Intake             1860 ml   Output              300 ml   Net             1560 ml       Nutrition  Orders Placed This Encounter   Procedures   • DIET ORDER     Standing Status:   Standing     Number of Occurrences:   1     Order Specific Question:   Diet:     Answer:   Regular [1]     Physical Exam   Constitutional: He is oriented to person, place, and time. He appears well-developed and well-nourished. No distress.   HENT:   Head: Normocephalic and atraumatic.   Mouth/Throat: No oropharyngeal exudate.   Eyes: Conjunctivae are normal. Pupils are equal, round, and reactive to light. Right eye exhibits no discharge. No scleral icterus.   Neck: Neck supple. No JVD present. No thyromegaly present.   Cardiovascular: Intact distal pulses.    No murmur heard.  Pulmonary/Chest: Effort normal and breath sounds normal. No stridor. No respiratory distress. He has no wheezes. He has no rales.   Abdominal: Soft. Bowel sounds are normal. He exhibits no distension. There is no tenderness. There is no rebound.   Musculoskeletal: He exhibits tenderness (left hip area, dressing intact clean and dry). He exhibits no edema.   Neurological: He is alert and oriented to person, place, and time.   Skin: Skin is warm. He is not diaphoretic. No erythema.   Psychiatric: He has a normal mood and affect. His behavior is normal. Thought content normal.       Recent Labs      11/18/17   1127   WBC  7.7   RBC  2.78*   HEMOGLOBIN  9.0*   HEMATOCRIT  26.2*   MCV  94.2   MCH  32.4   MCHC  34.4   RDW  52.9*   PLATELETCT  183   MPV  9.2     Recent Labs      11/18/17   1127   SODIUM  135   POTASSIUM  2.8*   CHLORIDE  106   CO2  20   GLUCOSE  109*   BUN  42*   CREATININE  3.62*   CALCIUM  10.3     Recent Labs      11/18/17   1127   APTT  32.9                   Assessment/Plan     * Femur fracture (CMS-HCC)   Assessment & Plan    S/p Open treatment with intramedullary nailing, left subtrochanteric femur fracture.  Pain control  PT/OT  DVT ppx        CKD III- (present on admission)   Assessment & Plan    Functions within baseline.        Multiple myeloma (CMS-HCC)- (present on admission)   Assessment & Plan    End stage on hospice with [facility].   Symptomatic management             Reviewed items::  Labs reviewed, Medications reviewed and Radiology images reviewed  Ramos catheter::  No Ramos  DVT prophylaxis - mechanical:  SCDs

## 2017-11-19 NOTE — ASSESSMENT & PLAN NOTE
S/p Open treatment with intramedullary nailing, left subtrochanteric femur fracture.  Pain control  PT/OT next 1-2 days, in the hospital , for which wife will take patient hoem on hospice.   DVT ppx

## 2017-11-19 NOTE — PROGRESS NOTES
"   Orthopaedic Progress Note    Interval changes:  Patient doing well post op  LLE Dressings CDI    ROS - Patient denies any new issues.  Pain well controlled.    Blood pressure 117/74, pulse 94, temperature 36.1 °C (96.9 °F), resp. rate 18, height 1.778 m (5' 10\"), weight 47.6 kg (105 lb), SpO2 99 %.      Patient seen and examined  No acute distress  Breathing non labored  RRR  LLE Surgical dressings are clean, dry, and intact. Patient clearly fires tibialis anterior, EHL, and gastrocnemius/soleus. Sensation is intact to light touch throughout superficial peroneal, deep peroneal, tibial, saphenous, and sural nerve distributions. Strong and palpable 2+ dorsalis pedis and posterior tibial pulses with capillary refill less than 2 seconds. No lower leg tenderness or discomfort.       Recent Labs      11/18/17   1127   WBC  7.7   RBC  2.78*   HEMOGLOBIN  9.0*   HEMATOCRIT  26.2*   MCV  94.2   MCH  32.4   MCHC  34.4   RDW  52.9*   PLATELETCT  183   MPV  9.2       Active Hospital Problems    Diagnosis   • Femur fracture (CMS-HCC) [S72.90XA]     Priority: High   • Multiple myeloma (CMS-HCC) [C90.00]   • CKD III [N18.3]       Assessment/Plan:  Doing well post op  Cleared for DC to SNF by ortho pending medicine clearance  POD#1 S/P Open treatment with intramedullary nailing, left subtrochanteric femur fracture.  Wt bearing status - WBAT LLE  Wound care/Drains - dressings left in place  Future Procedures - none planned  Sutures/Staples out- 10-14 days post operatively  PT/OT-initiated  Antibiotics: completed  DVT Prophylaxis- TEDS/SCDs/Foot pumps  Ramos-none  Case Coordination for Discharge Planning - Disposition SNF   "

## 2017-11-19 NOTE — THERAPY
"Physical Therapy Evaluation completed.   Bed Mobility:  Supine to Sit: Moderate Assist  Transfers: Sit to Stand: Unable to Participate  Gait: Level Of Assist: Unable to Participate        Plan of Care: Will benefit from Physical Therapy 3 times per week  Discharge Recommendations: Equipment: Will Continue to Assess for Equipment Needs. Post-acute therapy Discharge to home with outpatient or home health for additional skilled therapy services.    See \"Rehab Therapy-Acute\" Patient Summary Report for complete documentation.     "

## 2017-11-19 NOTE — PROGRESS NOTES
Pt arrived to unit 1620. C/o of severe pain, meds given see MAR. Left leg dressing CDI. CMS intact. ANDERSON. SCD's placed. Denies nausea, diet ordered. Wife and daughters at bedside. 2 RN skin check completed. Brenna IBRAHIM, 2nd RN witness. Assessment limited due to pain, unable to tolerate rolling over to assess bottom. Bilateral upper extremities noted with generalized bruising. Pt and family oriented to unit and call light. Fall precaution in place, call light within reach. Hourly rounding in place.

## 2017-11-19 NOTE — PROGRESS NOTES
Pt in stable condition, call light within reach, no complaints at this time, pain controlled, able to swallow pills whole with water, will continue with plan of care

## 2017-11-20 PROCEDURE — 97530 THERAPEUTIC ACTIVITIES: CPT

## 2017-11-20 PROCEDURE — 700105 HCHG RX REV CODE 258: Performed by: HOSPITALIST

## 2017-11-20 PROCEDURE — 99232 SBSQ HOSP IP/OBS MODERATE 35: CPT | Mod: GW | Performed by: HOSPITALIST

## 2017-11-20 PROCEDURE — A9270 NON-COVERED ITEM OR SERVICE: HCPCS | Performed by: HOSPITALIST

## 2017-11-20 PROCEDURE — 700102 HCHG RX REV CODE 250 W/ 637 OVERRIDE(OP): Performed by: HOSPITALIST

## 2017-11-20 PROCEDURE — 770001 HCHG ROOM/CARE - MED/SURG/GYN PRIV*

## 2017-11-20 RX ORDER — POTASSIUM CHLORIDE 20 MEQ/1
40 TABLET, EXTENDED RELEASE ORAL 2 TIMES DAILY
Status: COMPLETED | OUTPATIENT
Start: 2017-11-20 | End: 2017-11-20

## 2017-11-20 RX ORDER — HYDROMORPHONE HYDROCHLORIDE 2 MG/ML
0.5 INJECTION, SOLUTION INTRAMUSCULAR; INTRAVENOUS; SUBCUTANEOUS
Status: DISCONTINUED | OUTPATIENT
Start: 2017-11-20 | End: 2017-11-22 | Stop reason: HOSPADM

## 2017-11-20 RX ORDER — SODIUM CHLORIDE 9 MG/ML
500 INJECTION, SOLUTION INTRAVENOUS ONCE
Status: COMPLETED | OUTPATIENT
Start: 2017-11-20 | End: 2017-11-20

## 2017-11-20 RX ORDER — SODIUM CHLORIDE 9 MG/ML
INJECTION, SOLUTION INTRAVENOUS CONTINUOUS
Status: DISCONTINUED | OUTPATIENT
Start: 2017-11-20 | End: 2017-11-22 | Stop reason: HOSPADM

## 2017-11-20 RX ADMIN — SIMETHICONE CHEW TAB 80 MG 80 MG: 80 TABLET ORAL at 08:58

## 2017-11-20 RX ADMIN — HYDROXYZINE HYDROCHLORIDE 25 MG: 50 TABLET, FILM COATED ORAL at 22:02

## 2017-11-20 RX ADMIN — SIMETHICONE CHEW TAB 80 MG 80 MG: 80 TABLET ORAL at 22:06

## 2017-11-20 RX ADMIN — HYDROMORPHONE HYDROCHLORIDE 2 MG: 2 TABLET ORAL at 16:41

## 2017-11-20 RX ADMIN — HYDROXYZINE HYDROCHLORIDE 25 MG: 50 TABLET, FILM COATED ORAL at 08:58

## 2017-11-20 RX ADMIN — HYDROMORPHONE HYDROCHLORIDE 2 MG: 2 TABLET ORAL at 13:01

## 2017-11-20 RX ADMIN — POTASSIUM CHLORIDE 40 MEQ: 1500 TABLET, EXTENDED RELEASE ORAL at 22:03

## 2017-11-20 RX ADMIN — SODIUM CHLORIDE 500 ML: 9 INJECTION, SOLUTION INTRAVENOUS at 11:26

## 2017-11-20 RX ADMIN — SODIUM CHLORIDE: 9 INJECTION, SOLUTION INTRAVENOUS at 13:03

## 2017-11-20 RX ADMIN — SODIUM CHLORIDE: 9 INJECTION, SOLUTION INTRAVENOUS at 22:40

## 2017-11-20 RX ADMIN — POTASSIUM CHLORIDE 40 MEQ: 1500 TABLET, EXTENDED RELEASE ORAL at 11:26

## 2017-11-20 RX ADMIN — OMEPRAZOLE 20 MG: 20 CAPSULE, DELAYED RELEASE ORAL at 08:58

## 2017-11-20 RX ADMIN — STANDARDIZED SENNA CONCENTRATE AND DOCUSATE SODIUM 2 TABLET: 8.6; 5 TABLET, FILM COATED ORAL at 08:58

## 2017-11-20 ASSESSMENT — COGNITIVE AND FUNCTIONAL STATUS - GENERAL
MOBILITY SCORE: 6
SUGGESTED CMS G CODE MODIFIER MOBILITY: CN
CLIMB 3 TO 5 STEPS WITH RAILING: TOTAL
MOVING FROM LYING ON BACK TO SITTING ON SIDE OF FLAT BED: UNABLE
WALKING IN HOSPITAL ROOM: TOTAL
MOVING TO AND FROM BED TO CHAIR: UNABLE
TURNING FROM BACK TO SIDE WHILE IN FLAT BAD: UNABLE
STANDING UP FROM CHAIR USING ARMS: TOTAL

## 2017-11-20 ASSESSMENT — ENCOUNTER SYMPTOMS
FEVER: 0
DEPRESSION: 0
COUGH: 0
EYE PAIN: 0
BLOOD IN STOOL: 0
ORTHOPNEA: 0
DOUBLE VISION: 0
BACK PAIN: 0
MYALGIAS: 1
SPUTUM PRODUCTION: 0
STRIDOR: 0
VOMITING: 0
WEAKNESS: 1
NERVOUS/ANXIOUS: 0
HEARTBURN: 0
SORE THROAT: 0
NECK PAIN: 0
MEMORY LOSS: 0
PHOTOPHOBIA: 0
HEADACHES: 0
SHORTNESS OF BREATH: 0
BLURRED VISION: 0
TREMORS: 0
CLAUDICATION: 0
SENSORY CHANGE: 0
CONSTIPATION: 0
DIZZINESS: 0
NAUSEA: 0
CHILLS: 0
HEMOPTYSIS: 0
SPEECH CHANGE: 0
PALPITATIONS: 0
TINGLING: 0
PND: 0

## 2017-11-20 ASSESSMENT — PAIN SCALES - GENERAL
PAINLEVEL_OUTOF10: 4
PAINLEVEL_OUTOF10: 8
PAINLEVEL_OUTOF10: ASSUMED PAIN PRESENT
PAINLEVEL_OUTOF10: 5
PAINLEVEL_OUTOF10: ASSUMED PAIN PRESENT

## 2017-11-20 NOTE — DISCHARGE PLANNING
TCN met with patient and family at bedside to discuss their transitional care plans. Per patient wife plan is to DC home with hospice ([facility]) on Wednesday. Family requested to have therapy daily until then to assist with improving patient mobility to decrease burden of care at DC. TCN spoke with therapy to request daily therapy. Choice faxed to [facility] hospice. TCN to follow as needed.

## 2017-11-20 NOTE — CARE PLAN
Problem: Safety  Goal: Will remain free from injury  Outcome: PROGRESSING AS EXPECTED  Bed in the lowest locked position. Call light within reach. Hourly rounding in place.     Problem: Venous Thromboembolism (VTW)/Deep Vein Thrombosis (DVT) Prevention:  Goal: Patient will participate in Venous Thrombosis (VTE)/Deep Vein Thrombosis (DVT)Prevention Measures  Outcome: PROGRESSING AS EXPECTED  SCD's in use

## 2017-11-20 NOTE — DIETARY
"Nutrition Services: Consult for Low BMI, Poor Po, Wt Loss on Admit Screen    Past Medical History:   Diagnosis Date   • Anemia 1997    secondary to GIB    • Bronchitis     hx   • Cancer (CMS-HCC) 2016    multiple myeloma, Chemo started 1/25/16   • Fall     last summer from fainting episode   • Femur fracture (CMS-HCC) 12/2015    r/t myeloma    • GERD (gastroesophageal reflux disease)    • Hemorrhagic disorder (CMS-HCC)     esophageal tear from gerd   • High cholesterol    • Tribe (hard of hearing)    • Hypercholesteremia    • Hypertension     denies medication at this time, pt reports BP is \"normal\"   • Indigestion    • Pacemaker placed in 1999    removed 2008   • Renal disorder     kidney stones   • Snoring    • Syncope     when he gets dehydrated, has caffeine or around needles   • Urinary bladder disorder      Ht: 70\"  Wt: 105# (47.6 kg)  BMI: 15.1 (Underweight)  Pertinent Labs: K: 2.8, BUN: 42, Cr: 3.62, Alb: 2.8  Pertinent Meds: Atarax, Prilosec, Kdur, Senekot  Fluids:NS @ 100mL/hr  Skin: Incision to L hip  GI: Last BM 11/19  Diet: Regular    84 y/o Male admitted w/ Femur fx secondary to Multiple Myeloma seen today to assess weight history and nutrition status/ plan of care. Pt was asleep or very groggy, obtained information from pt's wife and daughter. Wife reports pt has a UBW off 165#, and has gradually been losing weight over the past year. This indicates a 60# (36%) unintentional weight loss in one year, which is severe. Wife and daughter report that he cannot taste foods, and they try to encourage PO at home as much as they can. They denied interest in Supplements or Enteral Nutrition support, per pt is DNR and on hospice care. Pt PO <25-50% most meals.  Added snacks BID per pt preferences per pt wife. They did not express any further questions or concerns at this time.     PLAN/RECOMMEND -   1) Nutrition rep to see patient daily for meal and snack preferences.  2) Encourage PO  3) Weekly weights to monitor " fluid and nutrition status  4) Obtain supplement order per RD as needed.    RD following

## 2017-11-20 NOTE — PROGRESS NOTES
"   Orthopaedic Progress Note    Interval changes:  Patient doing well post op  Placement pending   Cleared by ortho for DC home with hospice  LLE dressings CDI    ROS - Patient denies any new issues.  Pain well controlled.    Blood pressure (!) 98/52, pulse 94, temperature 36.3 °C (97.4 °F), resp. rate 17, height 1.778 m (5' 10\"), weight 47.6 kg (105 lb), SpO2 94 %.      Patient seen and examined  No acute distress  Breathing non labored  RRR  LLE Surgical dressings are clean, dry, and intact. Patient clearly fires tibialis anterior, EHL, and gastrocnemius/soleus. Sensation is intact to light touch throughout superficial peroneal, deep peroneal, tibial, saphenous, and sural nerve distributions. Strong and palpable 2+ dorsalis pedis and posterior tibial pulses with capillary refill less than 2 seconds. No lower leg tenderness or discomfort.       Recent Labs      11/18/17   1127   WBC  7.7   RBC  2.78*   HEMOGLOBIN  9.0*   HEMATOCRIT  26.2*   MCV  94.2   MCH  32.4   MCHC  34.4   RDW  52.9*   PLATELETCT  183   MPV  9.2       Active Hospital Problems    Diagnosis   • Femur fracture (CMS-[facility]) [S72.90XA]     Priority: High   • Multiple myeloma (CMS-[facility]) [C90.00]   • CKD III [N18.3]       Assessment/Plan:  Doing well post op  Cleared for DC to SNF by ortho pending medicine clearance  POD#2 S/P Open treatment with intramedullary nailing, left subtrochanteric femur fracture.  Wt bearing status - WBAT LLE  Wound care/Drains - dressings left in place  Future Procedures - none planned  Sutures/Staples out- 10-14 days post operatively  PT/OT-initiated  Antibiotics: completed  DVT Prophylaxis- TEDS/SCDs/Foot pumps  Ramos-none  Case Coordination for Discharge Planning - Disposition SNF   "

## 2017-11-20 NOTE — DISCHARGE PLANNING
SW met with pt's wife and children to discuss DME choice. Pt has FWW and transport WC from [facility].

## 2017-11-20 NOTE — PROGRESS NOTES
Report received. Assumed pt care. Pt resting, no signs of acute distress noted at this time. Fall precaution in place, call light within reach. Hourly rounding in place.

## 2017-11-21 LAB
ANION GAP SERPL CALC-SCNC: 10 MMOL/L (ref 0–11.9)
BUN SERPL-MCNC: 59 MG/DL (ref 8–22)
CALCIUM SERPL-MCNC: 9.1 MG/DL (ref 8.5–10.5)
CHLORIDE SERPL-SCNC: 109 MMOL/L (ref 96–112)
CO2 SERPL-SCNC: 16 MMOL/L (ref 20–33)
CREAT SERPL-MCNC: 4.12 MG/DL (ref 0.5–1.4)
GFR SERPL CREATININE-BSD FRML MDRD: 14 ML/MIN/1.73 M 2
GLUCOSE SERPL-MCNC: 86 MG/DL (ref 65–99)
POTASSIUM SERPL-SCNC: 3.7 MMOL/L (ref 3.6–5.5)
SODIUM SERPL-SCNC: 135 MMOL/L (ref 135–145)

## 2017-11-21 PROCEDURE — 770001 HCHG ROOM/CARE - MED/SURG/GYN PRIV*

## 2017-11-21 PROCEDURE — 97530 THERAPEUTIC ACTIVITIES: CPT

## 2017-11-21 PROCEDURE — 700105 HCHG RX REV CODE 258: Performed by: HOSPITALIST

## 2017-11-21 PROCEDURE — 80048 BASIC METABOLIC PNL TOTAL CA: CPT

## 2017-11-21 PROCEDURE — 97535 SELF CARE MNGMENT TRAINING: CPT

## 2017-11-21 PROCEDURE — 700102 HCHG RX REV CODE 250 W/ 637 OVERRIDE(OP): Performed by: HOSPITALIST

## 2017-11-21 PROCEDURE — A9270 NON-COVERED ITEM OR SERVICE: HCPCS | Performed by: HOSPITALIST

## 2017-11-21 PROCEDURE — 36415 COLL VENOUS BLD VENIPUNCTURE: CPT

## 2017-11-21 PROCEDURE — 99232 SBSQ HOSP IP/OBS MODERATE 35: CPT | Mod: GW | Performed by: INTERNAL MEDICINE

## 2017-11-21 RX ADMIN — SODIUM CHLORIDE: 9 INJECTION, SOLUTION INTRAVENOUS at 13:19

## 2017-11-21 RX ADMIN — HYDROMORPHONE HYDROCHLORIDE 2 MG: 2 TABLET ORAL at 13:19

## 2017-11-21 RX ADMIN — HYDROXYZINE HYDROCHLORIDE 25 MG: 50 TABLET, FILM COATED ORAL at 09:15

## 2017-11-21 RX ADMIN — FENTANYL 1 PATCH: 25 PATCH, EXTENDED RELEASE TRANSDERMAL at 15:49

## 2017-11-21 RX ADMIN — SIMETHICONE CHEW TAB 80 MG 80 MG: 80 TABLET ORAL at 19:29

## 2017-11-21 RX ADMIN — HYDROMORPHONE HYDROCHLORIDE 2 MG: 2 TABLET ORAL at 06:20

## 2017-11-21 RX ADMIN — SODIUM CHLORIDE: 9 INJECTION, SOLUTION INTRAVENOUS at 19:29

## 2017-11-21 RX ADMIN — SIMETHICONE CHEW TAB 80 MG 80 MG: 80 TABLET ORAL at 09:15

## 2017-11-21 RX ADMIN — HYDROMORPHONE HYDROCHLORIDE 2 MG: 2 TABLET ORAL at 19:35

## 2017-11-21 RX ADMIN — HYDROXYZINE HYDROCHLORIDE 25 MG: 50 TABLET, FILM COATED ORAL at 19:29

## 2017-11-21 RX ADMIN — OMEPRAZOLE 20 MG: 20 CAPSULE, DELAYED RELEASE ORAL at 09:15

## 2017-11-21 ASSESSMENT — ENCOUNTER SYMPTOMS
NECK PAIN: 0
WEAKNESS: 1
VOMITING: 0
EYE PAIN: 0
HEARTBURN: 0
TREMORS: 0
NERVOUS/ANXIOUS: 0
CONSTIPATION: 0
CLAUDICATION: 0
SPUTUM PRODUCTION: 0
BLURRED VISION: 0
MYALGIAS: 1
SENSORY CHANGE: 0
PHOTOPHOBIA: 0
DIZZINESS: 0
SORE THROAT: 0
TINGLING: 0
BLOOD IN STOOL: 0
SPEECH CHANGE: 0
SHORTNESS OF BREATH: 0
PALPITATIONS: 0
HEMOPTYSIS: 0
HEADACHES: 0
BACK PAIN: 0
COUGH: 0
DEPRESSION: 0
ORTHOPNEA: 0
NAUSEA: 0
PND: 0
CHILLS: 0
DOUBLE VISION: 0
STRIDOR: 0
MEMORY LOSS: 0
FEVER: 0

## 2017-11-21 ASSESSMENT — COGNITIVE AND FUNCTIONAL STATUS - GENERAL
PERSONAL GROOMING: A LITTLE
CLIMB 3 TO 5 STEPS WITH RAILING: TOTAL
STANDING UP FROM CHAIR USING ARMS: TOTAL
MOVING TO AND FROM BED TO CHAIR: UNABLE
EATING MEALS: A LITTLE
TURNING FROM BACK TO SIDE WHILE IN FLAT BAD: UNABLE
MOBILITY SCORE: 6
SUGGESTED CMS G CODE MODIFIER DAILY ACTIVITY: CK
DRESSING REGULAR LOWER BODY CLOTHING: A LOT
MOVING FROM LYING ON BACK TO SITTING ON SIDE OF FLAT BED: UNABLE
DRESSING REGULAR UPPER BODY CLOTHING: A LITTLE
WALKING IN HOSPITAL ROOM: TOTAL
SUGGESTED CMS G CODE MODIFIER MOBILITY: CN
TOILETING: TOTAL
HELP NEEDED FOR BATHING: A LOT
DAILY ACTIVITIY SCORE: 14

## 2017-11-21 ASSESSMENT — PAIN SCALES - GENERAL
PAINLEVEL_OUTOF10: 2
PAINLEVEL_OUTOF10: 7
PAINLEVEL_OUTOF10: 6

## 2017-11-21 ASSESSMENT — GAIT ASSESSMENTS: GAIT LEVEL OF ASSIST: UNABLE TO PARTICIPATE

## 2017-11-21 NOTE — PROGRESS NOTES
"   Orthopaedic Progress Note    Interval changes:  Patient doing well    Cleared by ortho for DC home with hospice  LLE dressings CDI    ROS - Patient denies any new issues.  Pain well controlled.    Blood pressure 102/53, pulse 79, temperature 36.4 °C (97.5 °F), resp. rate 16, height 1.778 m (5' 10\"), weight 47.6 kg (105 lb), SpO2 96 %.      Patient seen and examined  No acute distress  Breathing non labored  RRR  LLE Surgical dressings are clean, dry, and intact. Patient clearly fires tibialis anterior, EHL, and gastrocnemius/soleus. Sensation is intact to light touch throughout superficial peroneal, deep peroneal, tibial, saphenous, and sural nerve distributions. Strong and palpable 2+ dorsalis pedis and posterior tibial pulses with capillary refill less than 2 seconds. No lower leg tenderness or discomfort.         Active Hospital Problems    Diagnosis   • Femur fracture (CMS-HCC) [S72.90XA]     Priority: High   • Multiple myeloma (CMS-HCC) [C90.00]   • CKD III [N18.3]   • Hypokalemia [E87.6]   • Acute on chronic renal failure (CMS-HCC) [N17.9, N18.9]       Assessment/Plan:  Cleared for DC to SNF by ortho pending medicine clearance  POD#3 S/P Open treatment with intramedullary nailing, left subtrochanteric femur fracture.  Wt bearing status - WBAT LLE  Wound care/Drains - dressings left in place  Future Procedures - none planned  Sutures/Staples out- 10-14 days post operatively  PT/OT-initiated  Antibiotics: completed  DVT Prophylaxis- TEDS/SCDs/Foot pumps  Ramos-none  Case Coordination for Discharge Planning - Disposition SNF   "

## 2017-11-21 NOTE — THERAPY
"Pt w/ pain medication 30 minutes prior to session, however with attempt for sit to stand pt with significant increase in pain and resisting further mobility; therefore placed on bed pan w/ family assist; provided cues to family for spine protection. Continue to provide family training.    Physical Therapy Treatment completed.   Bed Mobility:  Supine to Sit: Refused (attempting; pt refusing w/ 2 attempting after > pain)  Transfers: Sit to Stand: Unable to Participate  Gait: Level Of Assist: Unable to Participate       Plan of Care: Will benefit from Physical Therapy 3 times per week  Discharge Recommendations: Equipment: Will Continue to Assess for Equipment Needs. Post-acute therapy Discharge to home with outpatient or home health for additional skilled therapy services.    Kelsey Weber PT, [phone]     See \"Rehab Therapy-Acute\" Patient Summary Report for complete documentation.       "

## 2017-11-21 NOTE — DISCHARGE PLANNING
VIVIANA spoke with Risa at [facility]. Order has not be received. [facility] does not arrange transport. SW to arrange [facility] for d/c tomorrow.

## 2017-11-21 NOTE — PROGRESS NOTES
[facility] Hospitalist Progress Note    Date of Service: 11/21/2017    Chief Complaint  83 y.o. male with history of multiple myeloma and is on hospice care, with history of pathological fractures of his right leg in the past, admitted 11/18/2017 with Left pathologic subtrochanteric femur fracture, displaced.    Interval Problem Update  S/p Open treatment with intramedullary nailing, left subtrochanteric femur fracture.  Pain control  PT/OT    11/20  No acute issues patient is comfortable Patient denies fevers/chills, chest pain, shortness of breath or nausea/vommiting.   Had long discussion with patient; wife and daugther. They contacted their hospice about PT but they are unable to provide this service, as a result patient's wife would like Inpatient PT for next 1-2 days for which she will take patient home and continue with hospice.     11/21 Pain was uncontrolled this morning, family is requesting fentanyl patch to be changed as it is old. If pain remains uncontrolled will consider oxy CR. Dressing CDI. Continue PTOT for one more day and discharge home with hospice tomorrow.     Consultants/Specialty  Orthopaedics    Disposition  PT/OT, hospice?        Review of Systems   Constitutional: Positive for malaise/fatigue. Negative for chills and fever.   HENT: Negative for congestion, hearing loss, sore throat and tinnitus.    Eyes: Negative for blurred vision, double vision, photophobia and pain.   Respiratory: Negative for cough, hemoptysis, sputum production, shortness of breath and stridor.    Cardiovascular: Negative for chest pain, palpitations, orthopnea, claudication and PND.   Gastrointestinal: Negative for blood in stool, constipation, heartburn, melena, nausea and vomiting.   Genitourinary: Negative for dysuria, frequency and urgency.   Musculoskeletal: Positive for joint pain and myalgias. Negative for back pain and neck pain.   Neurological: Positive for weakness. Negative for dizziness, tingling, tremors,  sensory change, speech change and headaches.   Psychiatric/Behavioral: Negative for depression, memory loss and suicidal ideas. The patient is not nervous/anxious.       Physical Exam  Laboratory/Imaging   Hemodynamics  Temp (24hrs), Av.7 °C (98 °F), Min:36.5 °C (97.7 °F), Max:36.9 °C (98.4 °F)   Temperature: 36.7 °C (98 °F)  Pulse  Av.7  Min: 60  Max: 108   Blood Pressure : (!) 99/51      Respiratory      Respiration: 16, Pulse Oximetry: 95 %     Work Of Breathing / Effort: Mild  RUL Breath Sounds: Diminished, RML Breath Sounds: Diminished, RLL Breath Sounds: Diminished, JULIO Breath Sounds: Diminished, LLL Breath Sounds: Diminished    Fluids    Intake/Output Summary (Last 24 hours) at 17 1049  Last data filed at 17 0600   Gross per 24 hour   Intake              200 ml   Output              800 ml   Net             -600 ml       Nutrition  Orders Placed This Encounter   Procedures   • DIET ORDER     Standing Status:   Standing     Number of Occurrences:   1     Order Specific Question:   Diet:     Answer:   Regular [1]     Physical Exam   Constitutional: He is oriented to person, place, and time. No distress.   Thin and frail   HENT:   Head: Normocephalic and atraumatic.   Mouth/Throat: No oropharyngeal exudate.   Eyes: Conjunctivae are normal. Pupils are equal, round, and reactive to light. Right eye exhibits no discharge. No scleral icterus.   Neck: Neck supple. No JVD present. No thyromegaly present.   Cardiovascular: Intact distal pulses.    No murmur heard.  Pulmonary/Chest: Effort normal and breath sounds normal. No stridor. No respiratory distress. He has no wheezes. He has no rales.   Abdominal: Soft. Bowel sounds are normal. He exhibits no distension. There is no tenderness. There is no rebound.   Musculoskeletal: He exhibits tenderness (minimal left hip area, dressing intact clean and dry, ). He exhibits no edema.   Neurological: He is alert and oriented to person, place, and time. No  cranial nerve deficit.   Skin: Skin is warm. He is not diaphoretic. No erythema.   Psychiatric: He has a normal mood and affect. His behavior is normal. Thought content normal.       Recent Labs      11/18/17   1127   WBC  7.7   RBC  2.78*   HEMOGLOBIN  9.0*   HEMATOCRIT  26.2*   MCV  94.2   MCH  32.4   MCHC  34.4   RDW  52.9*   PLATELETCT  183   MPV  9.2     Recent Labs      11/18/17   1127  11/21/17   0854   SODIUM  135  135   POTASSIUM  2.8*  3.7   CHLORIDE  106  109   CO2  20  16*   GLUCOSE  109*  86   BUN  42*  59*   CREATININE  3.62*  4.12*   CALCIUM  10.3  9.1     Recent Labs      11/18/17   1127   APTT  32.9                  Assessment/Plan     * Femur fracture (CMS-HCC)   Assessment & Plan    S/p Open treatment with intramedullary nailing, left subtrochanteric femur fracture.  Pain control  PT/OT next 1-2 days, in the hospital , for which wife will take patient hoem on hospice.   DVT ppx        CKD III- (present on admission)   Assessment & Plan    Functions within baseline.        Multiple myeloma (CMS-HCC)- (present on admission)   Assessment & Plan    End stage on hospice with [facility].   Symptomatic management         Hypokalemia- (present on admission)   Assessment & Plan    Replenish lytes, will repeat bmp in the morning for any changes          Acute on chronic renal failure (CMS-HCC)- (present on admission)   Assessment & Plan    As result of myeloma.  Will provide gentle IV fluid challenge, repeat levels in the am.            Patient plan of care discussed at multidisplinary team rounds and with patient and R.N at [facility].      Reviewed items::  Labs reviewed, Medications reviewed and Radiology images reviewed  Ramos catheter::  No Ramos  DVT prophylaxis - mechanical:  SCDs

## 2017-11-21 NOTE — THERAPY
"Pt demonstrated gradual progress with functional mobility and activity tolerance. Pt continues to require Max A for all mobility due to pain. Pt's spouse reports pt has not formally walked in past 3 months. Would like to be able to perform transfers to BS or W/C. PT will continue to follow while in house to address acute PT goals and family training needs.     Physical Therapy Treatment completed.   Bed Mobility:  Supine to Sit: Maximal Assist  Transfers: Sit to Stand: Maximal Assist (x1-2 people. utilized minna under buttocks for support)  Gait: Level Of Assist: Unable to Participate       Plan of Care: Will benefit from Physical Therapy 3 times per week  Discharge Recommendations: Equipment: Will Continue to Assess for Equipment Needs. Post-acute therapy Discharge to home with previous resources in place  See \"Rehab Therapy-Acute\" Patient Summary Report for complete documentation.       "

## 2017-11-21 NOTE — PROGRESS NOTES
Report received from day RN, and care assumed. Pt A&O x 4. Pt denies N/V. He complains of pain the LLE but states it significantly decreased from day shift and did not want medication for it. Dressing to the hip is CDI. Pt very hard of hearing, but is able to hear when spoken to very loud and with a lower pitched voice. Mepilex on Pt's sacrum due to redness that is still blanchable. Vital signs reviewed and are WNL. IV assessed and patent, with dressing CDI. Plan is to discharge Pt home with hospice on 11/22.  POC discussed with Pt and questions answered. POC includes pain control, bedrest, and home with hospice. Bed is in the lowest position, rails are up, and call light is within reach. Communication board updated and hourly rounding implemented.

## 2017-11-21 NOTE — THERAPY
"Occupational Therapy Treatment completed with focus on ADLs, ADL transfers, patient education and caregiver training.  Functional Status:  Pt seen today for OT tx to address family training and goals of increasing upright sitting tolerance in preparation for BSC and shower txfs. Pt currently needing maxA for bed mobility. Once seated EOB pt was able to support himself while he participated in UB cares. Pt stood with maxA x1-2 for safety. MaxA for LB cares. Once back in bed patient was able to complete grooming with setup. Spouse educated on use of tub transfer bench to make shower transfer easier/safer once he is able to do so.  Plan of Care: Will benefit from Occupational Therapy 2 times per week  Discharge Recommendations:  Equipment Tub Transfer Bench. Post-acute therapy Discharge to home with outpatient or home health for additional skilled therapy services with hospice per chart.    See \"Rehab Therapy-Acute\" Patient Summary Report for complete documentation.   "

## 2017-11-21 NOTE — PROGRESS NOTES
Shift report received from night RN, assumed care at 0715. Patient asleep in bed, AOx4, patient not currently expressing any pain at this time, routinely medicated prn per MAR. Pt on bedrest, WBAT-LLE, calls appropriately, bed alarm not in use. PIV lost early morning and needs replaced this am. Dressing CDI, O2 RA, SPO2 95%. VTE SCDs. Plan is home with hospice tomorrow. Discussed POC for multimodal pain management, monitoring VS/labs/I&O, mobility, day time routine, comfort, and safety. Patient has call light and personal belongings within reach. Safety and fall precautions in place. Reviewed current labs, notes, medications, and orders. Hourly rounding in place with RN rounding on odd hours and CNA on even hours.

## 2017-11-21 NOTE — DISCHARGE PLANNING
SW met with pt and wife at bedside to discuss IMM. Wife initialed for pt. Copy provided. Copy to chart.

## 2017-11-21 NOTE — CARE PLAN
Problem: Pain Management  Goal: Pain level will decrease to patient's comfort goal    Intervention: Follow pain managment plan developed in collaboration with patient and Interdisciplinary Team  Pt reports that his pain has significantly decreased since during the day. He has been asleep and appears comfortable.

## 2017-11-21 NOTE — PROGRESS NOTES
[facility] Hospitalist Progress Note    Date of Service: 2017    Chief Complaint  83 y.o. male with history of multiple myeloma and is on hospice care, with history of pathological fractures of his right leg in the past, admitted 2017 with Left pathologic subtrochanteric femur fracture, displaced.    Interval Problem Update  S/p Open treatment with intramedullary nailing, left subtrochanteric femur fracture.  Pain control  PT/OT      No acute issues patient is comfortable Patient denies fevers/chills, chest pain, shortness of breath or nausea/vommiting.   Had long discussion with patient; wife and daugther. They contacted their hospice about PT but they are unable to provide this service, as a result patient's wife would like Inpatient PT for next 1-2 days for which she will take patient home and continue with hospice.     Consultants/Specialty  Orthopaedics    Disposition  PT/OT, hospice?        Review of Systems   Constitutional: Positive for malaise/fatigue. Negative for chills and fever.   HENT: Negative for congestion, hearing loss, sore throat and tinnitus.    Eyes: Negative for blurred vision, double vision, photophobia and pain.   Respiratory: Negative for cough, hemoptysis, sputum production, shortness of breath and stridor.    Cardiovascular: Negative for chest pain, palpitations, orthopnea, claudication and PND.   Gastrointestinal: Negative for blood in stool, constipation, heartburn, melena, nausea and vomiting.   Genitourinary: Negative for dysuria, frequency and urgency.   Musculoskeletal: Positive for joint pain and myalgias. Negative for back pain and neck pain.   Neurological: Positive for weakness. Negative for dizziness, tingling, tremors, sensory change, speech change and headaches.   Psychiatric/Behavioral: Negative for depression, memory loss and suicidal ideas. The patient is not nervous/anxious.       Physical Exam  Laboratory/Imaging   Hemodynamics  Temp (24hrs), Av.6 °C  (97.8 °F), Min:36.3 °C (97.4 °F), Max:36.9 °C (98.4 °F)   Temperature: 36.9 °C (98.4 °F)  Pulse  Av.3  Min: 60  Max: 108   Blood Pressure : 100/64      Respiratory      Respiration: 16, Pulse Oximetry: 93 %     Work Of Breathing / Effort: Mild  RUL Breath Sounds: Diminished, RML Breath Sounds: Diminished, RLL Breath Sounds: Diminished, JULIO Breath Sounds: Diminished, LLL Breath Sounds: Diminished    Fluids    Intake/Output Summary (Last 24 hours) at 17 1713  Last data filed at 17 0900   Gross per 24 hour   Intake              200 ml   Output              425 ml   Net             -225 ml       Nutrition  Orders Placed This Encounter   Procedures   • DIET ORDER     Standing Status:   Standing     Number of Occurrences:   1     Order Specific Question:   Diet:     Answer:   Regular [1]     Physical Exam   Constitutional: He is oriented to person, place, and time. No distress.   Thin and frail   HENT:   Head: Normocephalic and atraumatic.   Mouth/Throat: No oropharyngeal exudate.   Eyes: Conjunctivae are normal. Pupils are equal, round, and reactive to light. Right eye exhibits no discharge. No scleral icterus.   Neck: Neck supple. No JVD present. No thyromegaly present.   Cardiovascular: Intact distal pulses.    No murmur heard.  Pulmonary/Chest: Effort normal and breath sounds normal. No stridor. No respiratory distress. He has no wheezes. He has no rales.   Abdominal: Soft. Bowel sounds are normal. He exhibits no distension. There is no tenderness. There is no rebound.   Musculoskeletal: He exhibits tenderness (minimal left hip area, dressing intact clean and dry, ). He exhibits no edema.   Neurological: He is alert and oriented to person, place, and time. No cranial nerve deficit.   Skin: Skin is warm. He is not diaphoretic. No erythema.   Psychiatric: He has a normal mood and affect. His behavior is normal. Thought content normal.       Recent Labs      17   1127   WBC  7.7   RBC  2.78*    HEMOGLOBIN  9.0*   HEMATOCRIT  26.2*   MCV  94.2   MCH  32.4   MCHC  34.4   RDW  52.9*   PLATELETCT  183   MPV  9.2     Recent Labs      11/18/17   1127   SODIUM  135   POTASSIUM  2.8*   CHLORIDE  106   CO2  20   GLUCOSE  109*   BUN  42*   CREATININE  3.62*   CALCIUM  10.3     Recent Labs      11/18/17   1127   APTT  32.9                  Assessment/Plan     * Femur fracture (CMS-HCC)   Assessment & Plan    S/p Open treatment with intramedullary nailing, left subtrochanteric femur fracture.  Pain control  PT/OT next 1-2 days, in the hospital , for which wife will take patient hoem on hospice.   DVT ppx        CKD III- (present on admission)   Assessment & Plan    Functions within baseline.        Multiple myeloma (CMS-HCC)- (present on admission)   Assessment & Plan    End stage on hospice with [facility].   Symptomatic management         Hypokalemia- (present on admission)   Assessment & Plan    Replenish lytes, will repeat bmp in the morning for any changes          Acute on chronic renal failure (CMS-HCC)- (present on admission)   Assessment & Plan    As result of myeloma.  Will provide gentle IV fluid challenge, repeat levels in the am.            Patient plan of care discussed at multidisplinary team rounds and with patient and R.N at [facility].      Reviewed items::  Labs reviewed, Medications reviewed and Radiology images reviewed  Ramos catheter::  No Ramos  DVT prophylaxis - mechanical:  SCDs

## 2017-11-22 VITALS
RESPIRATION RATE: 18 BRPM | TEMPERATURE: 98.7 F | BODY MASS INDEX: 15.03 KG/M2 | OXYGEN SATURATION: 93 % | HEIGHT: 70 IN | WEIGHT: 105 LBS | HEART RATE: 102 BPM | DIASTOLIC BLOOD PRESSURE: 72 MMHG | SYSTOLIC BLOOD PRESSURE: 113 MMHG

## 2017-11-22 PROBLEM — S72.90XA FEMUR FRACTURE (HCC): Status: RESOLVED | Noted: 2017-11-18 | Resolved: 2017-11-22

## 2017-11-22 LAB
ALBUMIN SERPL BCP-MCNC: 2.3 G/DL (ref 3.2–4.9)
ALBUMIN/GLOB SERPL: 1.4 G/DL
ALP SERPL-CCNC: 53 U/L (ref 30–99)
ALT SERPL-CCNC: <5 U/L (ref 2–50)
ANION GAP SERPL CALC-SCNC: 7 MMOL/L (ref 0–11.9)
AST SERPL-CCNC: 14 U/L (ref 12–45)
BILIRUB SERPL-MCNC: 0.3 MG/DL (ref 0.1–1.5)
BUN SERPL-MCNC: 55 MG/DL (ref 8–22)
CALCIUM SERPL-MCNC: 8.9 MG/DL (ref 8.5–10.5)
CHLORIDE SERPL-SCNC: 114 MMOL/L (ref 96–112)
CO2 SERPL-SCNC: 15 MMOL/L (ref 20–33)
CREAT SERPL-MCNC: 3.83 MG/DL (ref 0.5–1.4)
ERYTHROCYTE [DISTWIDTH] IN BLOOD BY AUTOMATED COUNT: 55.4 FL (ref 35.9–50)
GFR SERPL CREATININE-BSD FRML MDRD: 15 ML/MIN/1.73 M 2
GLOBULIN SER CALC-MCNC: 1.6 G/DL (ref 1.9–3.5)
GLUCOSE SERPL-MCNC: 80 MG/DL (ref 65–99)
HCT VFR BLD AUTO: 16.5 % (ref 42–52)
HGB BLD-MCNC: 5.4 G/DL (ref 14–18)
MCH RBC QN AUTO: 32 PG (ref 27–33)
MCHC RBC AUTO-ENTMCNC: 32.7 G/DL (ref 33.7–35.3)
MCV RBC AUTO: 97.6 FL (ref 81.4–97.8)
PLATELET # BLD AUTO: 167 K/UL (ref 164–446)
PMV BLD AUTO: 9.4 FL (ref 9–12.9)
POTASSIUM SERPL-SCNC: 3.8 MMOL/L (ref 3.6–5.5)
PROT SERPL-MCNC: 3.9 G/DL (ref 6–8.2)
RBC # BLD AUTO: 1.69 M/UL (ref 4.7–6.1)
SODIUM SERPL-SCNC: 136 MMOL/L (ref 135–145)
WBC # BLD AUTO: 8.7 K/UL (ref 4.8–10.8)

## 2017-11-22 PROCEDURE — 700105 HCHG RX REV CODE 258: Performed by: HOSPITALIST

## 2017-11-22 PROCEDURE — 99239 HOSP IP/OBS DSCHRG MGMT >30: CPT | Mod: GW | Performed by: INTERNAL MEDICINE

## 2017-11-22 PROCEDURE — A9270 NON-COVERED ITEM OR SERVICE: HCPCS | Performed by: HOSPITALIST

## 2017-11-22 PROCEDURE — 700102 HCHG RX REV CODE 250 W/ 637 OVERRIDE(OP): Performed by: HOSPITALIST

## 2017-11-22 PROCEDURE — 36415 COLL VENOUS BLD VENIPUNCTURE: CPT

## 2017-11-22 PROCEDURE — 85027 COMPLETE CBC AUTOMATED: CPT

## 2017-11-22 PROCEDURE — 80053 COMPREHEN METABOLIC PANEL: CPT

## 2017-11-22 RX ORDER — FENTANYL 25 UG/1
1 PATCH TRANSDERMAL
Qty: 5 PATCH | Refills: 0 | Status: SHIPPED | OUTPATIENT
Start: 2017-11-22

## 2017-11-22 RX ORDER — HYDROMORPHONE HYDROCHLORIDE 2 MG/1
2-4 TABLET ORAL EVERY 4 HOURS PRN
Qty: 25 TAB | Refills: 0 | Status: SHIPPED | OUTPATIENT
Start: 2017-11-22

## 2017-11-22 RX ADMIN — SODIUM CHLORIDE: 9 INJECTION, SOLUTION INTRAVENOUS at 00:45

## 2017-11-22 RX ADMIN — OMEPRAZOLE 20 MG: 20 CAPSULE, DELAYED RELEASE ORAL at 08:21

## 2017-11-22 RX ADMIN — HYDROMORPHONE HYDROCHLORIDE 2 MG: 2 TABLET ORAL at 11:24

## 2017-11-22 RX ADMIN — SIMETHICONE CHEW TAB 80 MG 80 MG: 80 TABLET ORAL at 08:22

## 2017-11-22 RX ADMIN — HYDROXYZINE HYDROCHLORIDE 25 MG: 50 TABLET, FILM COATED ORAL at 08:22

## 2017-11-22 RX ADMIN — STANDARDIZED SENNA CONCENTRATE AND DOCUSATE SODIUM 2 TABLET: 8.6; 5 TABLET, FILM COATED ORAL at 08:22

## 2017-11-22 RX ADMIN — HYDROMORPHONE HYDROCHLORIDE 2 MG: 2 TABLET ORAL at 08:21

## 2017-11-22 RX ADMIN — HYDROMORPHONE HYDROCHLORIDE 2 MG: 2 TABLET ORAL at 05:01

## 2017-11-22 ASSESSMENT — PAIN SCALES - GENERAL
PAINLEVEL_OUTOF10: 10
PAINLEVEL_OUTOF10: 8
PAINLEVEL_OUTOF10: 9
PAINLEVEL_OUTOF10: ASSUMED PAIN PRESENT
PAINLEVEL_OUTOF10: 8

## 2017-11-22 NOTE — PROGRESS NOTES
Tech from Lab called with critical result of Hgb at 5.4 and Hct of 16.2. Critical lab result read back to tech.   Dr. Velasquez notified of critical lab result at 0602.  Critical lab result read back by Dr. Velasquez.

## 2017-11-22 NOTE — CARE PLAN
Problem: Safety  Goal: Will remain free from injury  Outcome: PROGRESSING AS EXPECTED  Bed in the lowest locked position. Call light within reach. Hourly rounding in place.     Problem: Venous Thromboembolism (VTW)/Deep Vein Thrombosis (DVT) Prevention:  Goal: Patient will participate in Venous Thrombosis (VTE)/Deep Vein Thrombosis (DVT)Prevention Measures  Outcome: PROGRESSING AS EXPECTED  SCD's in use    Problem: Bowel/Gastric:  Goal: Normal bowel function is maintained or improved  Outcome: PROGRESSING AS EXPECTED

## 2017-11-22 NOTE — DISCHARGE PLANNING
SW called [facility] to notify them pt will be transporting home today. DNR script received from MD. Script in chart.

## 2017-11-22 NOTE — PROGRESS NOTES
Pt transported home via [facility]. Pt left with paramedics ID verified and copy placed in chart. Discharge instructions given to pt and wife, both verbalized understanding. IV d/c. Report given to [facility], DNR script given to paramedics. Pt home prescription given to wife. All questions and concerns answered.

## 2017-11-22 NOTE — DISCHARGE INSTRUCTIONS
Discharge Instructions    Discharged to home by medical transportation with escort. Discharged via ambulance, hospital escort: Medical transportation.   Special equipment needed: Not Applicable    Be sure to schedule a follow-up appointment with your primary care doctor or any specialists as instructed.     Discharge Plan:   Diet Plan: Discussed  Activity Level: Discussed  Confirmed Follow up Appointment: No (Comments) (Pt on hospice)  Confirmed Symptoms Management: Discussed  Medication Reconciliation Updated: Yes  Influenza Vaccine Indication: Patient Refuses    I understand that a diet low in cholesterol, fat, and sodium is recommended for good health. Unless I have been given specific instructions below for another diet, I accept this instruction as my diet prescription.   Other diet: Healthy regular diet    Special Instructions: Discharge instructions for the Orthopedic Patient    Follow up with Primary Care Physician within 2 weeks of discharge to home, regarding:  Review of medications and diagnostic testing.  Surveillance for medical complications.  Workup and treatment of osteoporosis, if appropriate.     -Is this a Joint Replacement patient? No    -Is this patient being discharged with medication to prevent blood clots?  No    · Is patient discharged on Warfarin / Coumadin?   No     · Is patient Post Blood Transfusion?  No    Depression / Suicide Risk    As you are discharged from this [facility] facility, it is important to learn how to keep safe from harming yourself.    Recognize the warning signs:  · Abrupt changes in personality, positive or negative- including increase in energy   · Giving away possessions  · Change in eating patterns- significant weight changes-  positive or negative  · Change in sleeping patterns- unable to sleep or sleeping all the time   · Unwillingness or inability to communicate  · Depression  · Unusual sadness, discouragement and loneliness  · Talk of wanting to  die  · Neglect of personal appearance   · Rebelliousness- reckless behavior  · Withdrawal from people/activities they love  · Confusion- inability to concentrate     If you or a loved one observes any of these behaviors or has concerns about self-harm, here's what you can do:  · Talk about it- your feelings and reasons for harming yourself  · Remove any means that you might use to hurt yourself (examples: pills, rope, extension cords, firearm)  · Get professional help from the community (Mental Health, Substance Abuse, psychological counseling)  · Do not be alone:Call your Safe Contact- someone whom you trust who will be there for you.  · Call your local CRISIS HOTLINE 761-7463 or 675-937-2407  · Call your local Children's Mobile Crisis Response Team [place] (412) 216-1762 or www.[employer]  · Call the toll free National Suicide Prevention Hotlines   · National Suicide Prevention Lifeline 154-000-XZSW (0126)  · [employer] Line Network 800-SUICIDE (339-7722)      Femoral Shaft Fracture  A femoral shaft fracture is a break (fracture) in the shaft of the thigh bone (femur). The femur is the long bone that connects the hip joint to the knee joint. Most femoral shaft fractures are closed fractures. A closed fracture is a break in a bone that happens without any cuts (lacerations) through the skin that is near the fracture site. Some femoral shaft fractures are open fractures. An open fracture is a break in a bone that happens along with lacerations through the skin that is near the fracture site.   CAUSES  A healthy femur may break from a forceful impact, such as from:  · A fall, especially from a great height.  · A high-impact sports injury.  · A car or motorcycle accident.  A weakened femur may break from minimal impact or force due to:  · Certain medical conditions.  · Age.  RISK FACTORS  This condition is more likely to develop in:  · Older people.  · People who have certain medical conditions that  cause bones to become weak or thin, such as:  ¨ Osteoporosis.  ¨ Cancer.  ¨ Osteogenesis imperfecta. This is a condition that involves bone weakness that is due to abnormal bone development.  · People who take certain medicines (bisphosphonates) that are used to treat osteoporosis.  · People who participate in high-risk sports or impact sports.  SYMPTOMS  Symptoms of this condition include:  · Severe pain.  · Inability to walk.  · Bruising.  · Swelling or visible deformity of the leg.  · Substantial bleeding, if it is an open fracture.  DIAGNOSIS  This condition is diagnosed based on your symptoms and a physical exam. You may also have other tests, including:  · X-rays of the femur. Since the force required to break a healthy femur can break other bones, X-rays are often taken of the hip, knee, and pelvis as well.  · Evaluation of the blood vessels with specialized X-rays (arteriogram).  · Evaluation of the nerves in the area of the break.  · CT scan or MRI.  TREATMENT  A femoral shaft fracture usually requires surgery. You may have one or more of the following surgical treatments:  · External fixation. This involves using pins and screws to hold the bones in place if there is extensive soft tissue injury. After some time, you may need an additional surgical treatment, such as intramedullary nailing.  · Intramedullary nailing. This involves inserting a nohemy (intramedullary nail) through an incision. The intramedullary nail goes down the center of the shaft of the femur. It may be inserted in the knee joint or from the top of the femur near the hip. Generally, screws are placed through the nohemy at both ends to prevent shortening or rotation of the femur as it heals.  · Plates to stabilize the fracture. These may be used, especially when the fracture is at either end of the bone, near the hip or the knee.  In rare cases for which surgery is not an option, a cast or a splint may be used to hold (immobilize) the bone  while it heals.  PREVENTION  If factors such as certain medical conditions or age increase your risk for another femoral shaft fracture, you may be able to prevent one if you follow these instructions:  · Use aids for walking, such as a walker or cane, as directed by your health care provider.  · Follow instructions from your health care provider about how to strengthen your bones if you have osteoporosis.     This information is not intended to replace advice given to you by your health care provider. Make sure you discuss any questions you have with your health care provider.     Document Released: 09/27/2006 Document Revised: 05/03/2016 Document Reviewed: 08/03/2015  [employer] Patient Education ©2016 [employer]    fentanyl skin patch  What is this medicine?  FENTANYL (FEN ta nil) is a pain reliever. It is used to treat persistent, moderate to severe chronic pain. It is used only by people who have been taking an opioid or narcotic pain medicine for more than one week.  This medicine may be used for other purposes; ask your health care provider or pharmacist if you have questions.  COMMON BRAND NAME(S): Duragesic  What should I tell my health care provider before I take this medicine?  They need to know if you have any of these conditions:  -brain tumor  -Crohn's disease, inflammatory bowel disease, or ulcerative colitis  -drug abuse or addiction  -head injury  -heart disease  -if you frequently drink alcohol containing drinks  -kidney disease or problems going to the bathroom  -liver disease  -lung disease, asthma, or breathing problems  -mental problems  -skin problems  -taken isocarboxazid, phenelzine, tranylcypromine, or selegiline in the past 2 weeks  -an allergic or unusual reaction to fentanyl, meperidine, other medicines, foods, dyes, or preservatives  -pregnant or trying to get pregnant  -breast-feeding  How should I use this medicine?  Apply the patch to your skin. Do not cut or damage the  patch. A cut or damaged patch can be very dangerous because you may get too much medicine. Select a clean, dry area of skin above your waist on your front or back. The upper back is a good spot to put the patch on children or people who are confused because it will be hard for them to remove the patch. Do not apply the patch to oily, broken, burned, cut, or irritated skin. Use only water to clean the area. Do not use soap or alcohol to clean the skin because this can increase the effects of the medicine. If the area is hairy, clip the hair with scissors, but do not shave.  Take the patch out of its wrapper, and take off the protective strip over the sticky part. Do not use a patch if the packaging or backing is damaged. Do not touch the sticky part with your fingers. Press the sticky surface to the skin using the palm of your hand. Press the patch to the skin for 30 seconds. Wash your hands at once with soap and water.  Take off the old patch before putting on a new patch. Apply each new patch to a different area of skin. If a patch comes off or causes irritation, remove it and apply a new patch to different site. To get rid of used patches, fold the patch in half with the sticky sides together. Then, flush it down the toilet. Do not discard the patch in the garbage. Pets and children can be harmed if they find used or lost patches. Replace the patch every 3 days or as directed by your doctor or health care professional. Follow the directions on the prescription label. Do not take more medicine than you are told to take.  A special MedGuide will be given to you by the pharmacist with each prescription and refill. Be sure to read this information carefully each time.  Talk to your pediatrician regarding the use of this medicine in children. While this drug may be prescribed for children as young as 2 years old for selected conditions, precautions do apply.  If someone accidentally uses a fentanyl patch and is not  awake and alert, immediately call 911 for help. If the person is awake and alert, call a doctor, health care professional, or the Poison Control Center.  Overdosage: If you think you have taken too much of this medicine contact a poison control center or emergency room at once.  NOTE: This medicine is only for you. Do not share this medicine with others.  What if I miss a dose?  If you forget to replace your patch, take off the old patch and put on a new patch as soon as you can. Do not apply an extra patch to your skin. Do not wear more than one patch at the same time unless told to do so by your doctor or health care professional.  What may interact with this medicine?  -alcohol  -antihistamines  -clarithromycin  -diltiazem  -erythromycin  -herbal products that contain [place]'s wort  -itraconazole  -ketoconazole  -medicines for depression, anxiety, or psychotic disturbances  -medicines for sleep  -muscle relaxants  -naltrexone  -narcotic medicines (opiates) for pain  -nelfinavir  -nicardipine  -phenobarbital, phenytoin, or fosphenytoin  -rifampin  -ritonavir  -troleandomycin  -verapamil  This list may not describe all possible interactions. Give your health care provider a list of all the medicines, herbs, non-prescription drugs, or dietary supplements you use. Also tell them if you smoke, drink alcohol, or use illegal drugs. Some items may interact with your medicine.  What should I watch for while using this medicine?  Other pain medicine may be needed the first day you use the patch because the patch can take some time to start working. Tell your doctor or health care professional if your pain does not go away, if it gets worse, or if you have new or a different type of pain. You may develop tolerance to the medicine. Tolerance means that you will need a higher dose of the medicine for pain relief. Tolerance is normal and is expected if you take the medicine for a long time.  Do not suddenly stop taking your  medicine because you may develop a severe reaction. Your body becomes used to the medicine. This does NOT mean you are addicted. Addiction is a behavior related to getting and using a drug for a non-medical reason. If you have pain, you have a medical reason to take pain medicine. Your doctor will tell you how much medicine to take. If your doctor wants you to stop the medicine, the dose will be slowly lowered over time to avoid any side effects.  You may get drowsy or dizzy when you first start taking the medicine or change doses. Do not drive, use machinery, or do anything that may be dangerous until you know how the medicine affects you. Stand or sit up slowly.  There are different types of narcotic medicines (opiates) for pain. If you take more than one type at the same time, you may have more side effects. Give your health care provider a list of all medicines you use. Your doctor will tell you how much medicine to take. Do not take more medicine than directed. Call emergency for help if you have problems breathing.  The medicine will cause constipation. Try to have a bowel movement at least every 2 to 3 days. If you do not have a bowel movement for 3 days, call your doctor or health care professional.  Your mouth may get dry. Drinking water, chewing sugarless gum, or sucking on hard candy may help. See your dentist every 6 months.  Heat can increase the amount of medicine released from the patch. Do not get the patch hot by using heating pads, heated water beds, electric blankets, and heat lamps. You can bathe or swim while using the patch. But, do not use a sauna or hot tub. Tell you doctor or health care professional if you get a fever.  If gel leaks from the patch, wash your skin well with water only. Do not use soap or cleansers containing alcohol. Remove the patch from your skin and discard as instructed above. Medicine from a partly attached or leaking patch can transfer to a child or pet when they are  being held. Exposure of children or pets to the patch can cause serious side effects and even death.  If you are going to have a magnetic resonance imaging (MRI) procedure, tell your MRI technician if you have this patch on your body. It must be removed before a MRI.  What side effects may I notice from receiving this medicine?  Side effects that you should report to your doctor or health care professional as soon as possible:  -allergic reactions like skin rash, itching or hives, swelling of the face, lips, or tongue  -breathing problems  -changes in vision  -confusion  -feeling faint, lightheaded  -fever, flu-like symptoms  -hallucination  -high or low blood pressure  -irregular heartbeat  -problems with balance, talking, walking  -trouble passing urine or change in the amount of urine  -unusual bleeding or bruising  -unusually weak or tired  Side effects that usually do not require medical attention (report to your doctor or health care professional if they continue or are bothersome):  -constipation  -dry mouth  -itching where the patch was applied  -loss of appetite  -nausea, vomiting  -sweating  This list may not describe all possible side effects. Call your doctor for medical advice about side effects. You may report side effects to FDA at 8-016-FDA-3094.  Where should I keep my medicine?  Keep out of the reach of children. This medicine can be abused. Keep your medicine in a safe place to protect it from theft. Do not share this medicine with anyone. Selling or giving away this medicine is dangerous and against the law.  Store below 77 degrees F (25 degrees C). Do not store the patches out of their wrappers. Flush any unused medicines down the toilet as instructed above. Do not use the medicine after the expiration date.  NOTE: This sheet is a summary. It may not cover all possible information. If you have questions about this medicine, talk to your doctor, pharmacist, or health care provider.  © 2014,  Elsevier/[employer] Standard. (8/22/2012 1:16:46 PM)    Hydromorphone tablets  What is this medicine?  HYDROMORPHONE (niranjan droe MOR fone) is a pain reliever. It is used to treat moderate to severe pain.  This medicine may be used for other purposes; ask your health care provider or pharmacist if you have questions.  COMMON BRAND NAME(S): Hemanthid  What should I tell my health care provider before I take this medicine?  They need to know if you have any of these conditions:  -brain tumor  -drug abuse or addiction  -head injury  -heart disease  -frequently drink alcohol containing drinks  -kidney disease or problems going to the bathroom  -liver disease  -lung disease, asthma, or breathing problems  -mental problems  -an allergic or unusual reaction to lactose, hydromorphone, other opioid analgesics, other medicines, sulfites, foods, dyes, or preservatives  -pregnant or trying to get pregnant  -breast-feeding  How should I use this medicine?  Take this medicine by mouth with a glass of water. If the medicine upsets your stomach, take it with food or milk. Follow the directions on the prescription label. Do not take more medicine than you are told to take.  Talk to your pediatrician regarding the use of this medicine in children. Special care may be needed.  Overdosage: If you think you have taken too much of this medicine contact a poison control center or emergency room at once.  NOTE: This medicine is only for you. Do not share this medicine with others.  What if I miss a dose?  If you miss a dose, take it as soon as you can. If it is almost time for your next dose, take only that dose. Do not take double or extra doses.  What may interact with this medicine?  -alcohol  -antihistamines for allergy, cough and cold  -medicines for anesthesia  -medicines for depression, anxiety, or psychotic disturbances  -medicines for sleep  -muscle relaxants  -naltrexone  -narcotic medicines (opiates) for pain  -phenothiazines like  chlorpromazine, mesoridazine, prochlorperazine, thioridazine  -tramadol  This list may not describe all possible interactions. Give your health care provider a list of all the medicines, herbs, non-prescription drugs, or dietary supplements you use. Also tell them if you smoke, drink alcohol, or use illegal drugs. Some items may interact with your medicine.  What should I watch for while using this medicine?  Tell your doctor or health care professional if your pain does not go away, if it gets worse, or if you have new or a different type of pain. You may develop tolerance to the medicine. Tolerance means that you will need a higher dose of the medicine for pain relief. Tolerance is normal and is expected if you take this medicine for a long time.  Do not suddenly stop taking your medicine because you may develop a severe reaction. Your body becomes used to the medicine. This does NOT mean you are addicted. Addiction is a behavior related to getting and using a drug for a non-medical reason. If you have pain, you have a medical reason to take pain medicine. Your doctor will tell you how much medicine to take. If your doctor wants you to stop the medicine, the dose will be slowly lowered over time to avoid any side effects.  You may get drowsy or dizzy. Do not drive, use machinery, or do anything that needs mental alertness until you know how this medicine affects you. Do not stand or sit up quickly, especially if you are an older patient. This reduces the risk of dizzy or fainting spells. Alcohol may interfere with the effect of this medicine. Avoid alcoholic drinks.  There are different types of narcotic medicines (opiates) for pain. If you take more than one type at the same time, you may have more side effects. Give your health care provider a list of all medicines you use. Your doctor will tell you how much medicine to take. Do not take more medicine than directed. Call emergency for help if you have problems  breathing.  This medicine will cause constipation. Try to have a bowel movement at least every 2 to 3 days. If you do not have a bowel movement for 3 days, call your doctor or health care professional.  Your mouth may get dry. Chewing sugarless gum or sucking hard candy, and drinking plenty of water may help. Contact your doctor if the problem does not go away or is severe.  What side effects may I notice from receiving this medicine?  Side effects that you should report to your doctor or health care professional as soon as possible:  -allergic reactions like skin rash, itching or hives, swelling of the face, lips, or tongue  -breathing problems  -changes in vision  -confusion  -feeling faint or lightheaded, falls  -seizures  -slow or fast heartbeat  -trouble passing urine or change in the amount of urine  -trouble with balance, talking, walking  Side effects that usually do not require medical attention (report to your doctor or health care professional if they continue or are bothersome):  -difficulty sleeping  -drowsiness  -dry mouth  -flushing  -headache  -itching  -loss of appetite  -nausea, vomiting  This list may not describe all possible side effects. Call your doctor for medical advice about side effects. You may report side effects to FDA at 7-687-FDA-9095.  Where should I keep my medicine?  Keep out of the reach of children. This medicine can be abused. Keep your medicine in a safe place to protect it from theft. Do not share this medicine with anyone. Selling or giving away this medicine is dangerous and against the law.  Store at room temperature between 15 and 30 degrees C (59 and 86 degrees F). Keep container tightly closed. Protect from light.  Discard unused medicine and used packaging carefully. Pets and children can be harmed if they find used or lost packages. Flush any unused medicines down the toilet. Do not use the medicine after the expiration date.  NOTE: This sheet is a summary. It may not  cover all possible information. If you have questions about this medicine, talk to your doctor, pharmacist, or health care provider.  © 2014, Elsevier/[employer] Standard. (8/24/2012 3:08:53 PM)

## 2017-11-22 NOTE — PROGRESS NOTES
83yoM with left pathologic subtrochanteric femur fracture related to myeloma s/p IMN 11/18.  On hospice but treated surgically for pain control and to improve quality of life.    S: Says pain is better controlled at home, hoping to get back home soon.    O:  Vitals:    11/21/17 1200 11/21/17 1600 11/21/17 2000 11/22/17 0410   BP: 102/53 101/61 107/68 117/75   Pulse: 79 100 (!) 104 (!) 108   Resp: 16 16 18 18   Temp: 36.4 °C (97.5 °F) 37.1 °C (98.8 °F) 35.8 °C (96.5 °F) 36.9 °C (98.4 °F)   SpO2: 96% 96% 91% 95%   Weight:       Height:         Exam:  General-NAD, alert and following commands, hard of hearing  LLE- +EHL/FHL/TA/GS motor, SILT in foot, palp dp pulse, thigh dressings c/d/i    Hct: 16.5    A: 83yoM with left pathologic subtrochanteric femur fracture related to myeloma s/p IMN 11/18.  On hospice but treated surgically for pain control and to improve quality of life.    Recs:  --WBAT LLE  --PT/OT for mobilization  --continue SCDs  --pain management per hospitalist service  --fu 10-14 days postop for staple removal

## 2017-11-22 NOTE — DISCHARGE PLANNING
Transport arranged with [facility]. Patient will be leaving today @1200 via [facility] going home on hospice. VIVIANA Negrete notified.

## 2017-11-22 NOTE — DOCUMENTATION QUERY
DOCUMENTATION QUERY    PROVIDERS: Please select “Cosign w/ note”to reply to query.    To better represent the severity of illness of your patient, please review the following information and exercise your independent professional judgment in responding to this query. Please reference the information at the bottom of this query for clinical criteria to support malnutrition.    BMI 15.1, patient reports a 60# (36%) unintentional weight loss in one year, which is severe and patient has poor PO intake per registered dietician note dated 11/20/17.    Patient with multiple myeloma and pathologic fracture of the left femur.    Progress notes describe patient as thin and frail throughout the medical record.      Based upon the clinical findings, risk factors, and treatment, can a diagnosis be provided to support this finding?     • Mild Protein Calorie Malnutrition  • Moderate Protein Calorie Malnutrition  • Severe Protein Calorie Malnutrition  • Cachexia  • Underweight  • Findings of no clinical significance  • Other explanation of clinical findings  • Unable to determine      The medical record reflects the following:   Clinical Findings Documented low BMI, poor PO intake and unintentional weight loss   Patient with multiple myeloma and pathological fracture of the left femur   Treatment Registered dietician consult   IM nail of pathological fracture left femur  Hospice care   Repeat labs   Encourage PO   Weekly weights to monitor fluid and nutrition status  Added snacks BID per patient preferences   Nutritional goal - patient will consume >50% of meals= outcome not met    Risk Factors Advanced age  Chronic kidney disease stage 3  Acute renal failure  Multiple myeloma  Pathological fracture of the left femur requiring intramedullary internal fixation    Location within medical record Progress Notes, Lab Results and registered dietician notes      Thank you,   Divina Jones RN  Clinical Documentation  [facility]  [phone]      .

## 2017-11-22 NOTE — PROGRESS NOTES
Report received. Assumed pt care. Pt c/o of discomfort, pt repositioned. Sitting up in bed having breakfast. No signs of acute distress noted. Fall precaution in place, call light within reach. Hourly rounding in place.

## 2017-11-23 NOTE — DISCHARGE SUMMARY
CHIEF COMPLAINT ON ADMISSION  Chief Complaint   Patient presents with   • Dislocation Hip       CODE STATUS  Prior    HPI & HOSPITAL COURSE  Please review H&P for details on admission.   This is a 83 y.o. male With a past medical history of advanced multiple myeloma on home hospice presented with left leg pain and was found to have a pathological femur fracture. Orthopedic surgery was consulted and the patient underwent Open treatment with intramedullary nailing, left   subtrochanteric femur fracture for palliative purposes. The following day he was provided with pain control with Dilaudid and fentanyl and PT OT.  His hemoglobin dropped from 9-5.4, however due to the patient being bloodless the family and the patient refused any blood transfusions. The patient was discharged home with home hospice.    Therefore, he is discharged in guarded and stable condition with close outpatient follow-up.    SPECIFIC OUTPATIENT FOLLOW-UP  home hospice    DISCHARGE PROBLEM LIST  Principal Problem (Resolved):    Femur fracture (CMS-HCC) POA: Yes  Active Problems:    Multiple myeloma (CMS-HCC) POA: Yes    CKD III POA: Yes  Resolved Problems:    Acute on chronic renal failure (CMS-HCC) POA: Yes    Hypokalemia POA: Yes      FOLLOW UP  No future appointments.  No follow-up provider specified.    MEDICATIONS ON DISCHARGE   Silviano Peterson Carlos   Home Medication Instructions [id number]    Printed on:11/22/17 1657   Medication Information                      fentanyl (DURAGESIC) 25 MCG/HR PATCH 72 HR  Apply 1 Patch to skin as directed every 72 hours.             HYDROmorphone (DILAUDID) 2 MG Tab  Take 1-2 Tabs by mouth every four hours as needed for Severe Pain.             hydrOXYzine HCl (ATARAX) 25 MG Tab  Take 25 mg by mouth 2 Times a Day.             NON SPECIFIED  Apply 1 Application to affected area(s) every 6 hours as needed. C-PDR 25/2/10MG/GM  PROMETHAZI E 25/2/10M    1 ml every 6 hours for nausea             omeprazole  (PRILOSEC) 20 MG delayed-release capsule  Take 20 mg by mouth every day.             ondansetron (ZOFRAN ODT) 4 MG TABLET DISPERSIBLE  Take 4 mg by mouth every 6 hours as needed for Nausea.             simethicone (MYLICON) 80 MG Chew Tab  Take 80 mg by mouth 2 Times a Day.                 DIET  No orders of the defined types were placed in this encounter.      ACTIVITY  As tolerated.  Weight bearing as tolerated      CONSULTATIONS  Ortho     PROCEDURES  As above    DX-PORTABLE FLUORO > 1 HOUR   Final Result         Portable fluoroscopy utilized for 42 seconds.      DX-FEMUR-2+ LEFT   Final Result      Intraoperative images as described.      DX-FEMUR-2+ LEFT   Final Result      1.  Fracture the left proximal femur in a subtrochanteric location. This is likely pathologic in nature with a lytic lesion seen at that location.      2.  Multiple other lytic lesions seen scattered throughout the femur and adjacent pelvis consistent with either multiple myeloma or metastatic disease.      DX-PELVIS-1 OR 2 VIEWS   Final Result      1.  Left proximal femoral subtrochanteric fracture.      2.  Possible multiple small lytic lesions scattered throughout the osseous structures to include the left proximal femur which may indicate presence of pathologic fracture.      3.  Severe osteopenia.      4.  Prior right hip arthroplasty.      DX-CHEST-PORTABLE (1 VIEW)   Final Result      1.  Patchy bilateral atelectasis. Cardiomegaly.      2.  Multiple right rib fractures likely chronic in nature.            LABORATORY  Lab Results   Component Value Date/Time    SODIUM 136 11/22/2017 04:04 AM    POTASSIUM 3.8 11/22/2017 04:04 AM    CHLORIDE 114 (H) 11/22/2017 04:04 AM    CO2 15 (L) 11/22/2017 04:04 AM    GLUCOSE 80 11/22/2017 04:04 AM    BUN 55 (H) 11/22/2017 04:04 AM    CREATININE 3.83 (H) 11/22/2017 04:04 AM        Lab Results   Component Value Date/Time    WBC 8.7 11/22/2017 04:04 AM    HEMOGLOBIN 5.4 (LL) 11/22/2017 04:04  AM    HEMATOCRIT 16.5 (LL) 11/22/2017 04:04 AM    PLATELETCT 167 11/22/2017 04:04 AM        Total time of the discharge process exceeds 32 minutes

## (undated) DEVICE — SENSOR SPO2 NEO LNCS ADHESIVE (20/BX) SEE USER NOTES

## (undated) DEVICE — PROTECTOR ULNA NERVE - (36PR/CA)

## (undated) DEVICE — DRAPE C ARMOR (12EA/CA)

## (undated) DEVICE — BOVIE BLADE COATED - (50/PK)

## (undated) DEVICE — DRAPE U ORTHOPEDIC - (10/BX)

## (undated) DEVICE — SUTURE GENERAL

## (undated) DEVICE — DRAPESURG STERI-DRAPE LONG - (10/BX 4BX/CA)

## (undated) DEVICE — DRESSING LEUKOMED STERILE 11.75X4IN - (50/CA)

## (undated) DEVICE — ELECTRODE 850 FOAM ADHESIVE - HYDROGEL RADIOTRNSPRNT (50/PK)

## (undated) DEVICE — GLOVE SZ 7.5 BIOGEL PI MICRO - PF LF (50PR/BX)

## (undated) DEVICE — SPONGE GAUZE STER 4X4 8-PL - (2/PK 50PK/BX 12BX/CS)

## (undated) DEVICE — DRESSING XEROFORM 1X8 - (50/BX 4BX/CA)

## (undated) DEVICE — CHLORAPREP 26 ML APPLICATOR - ORANGE TINT(25/CA)

## (undated) DEVICE — SLEEVE, VASO, THIGH, MED

## (undated) DEVICE — SUTURE 2-0 VICRYL PLUS CT-1 36 (36PK/BX)"

## (undated) DEVICE — NEPTUNE 4 PORT MANIFOLD - (20/PK)

## (undated) DEVICE — DRAPE C-ARM LARGE 41IN X 74 IN - (10/BX 2BX/CA)

## (undated) DEVICE — SET LEADWIRE 5 LEAD BEDSIDE DISPOSABLE ECG (1SET OF 5/EA)

## (undated) DEVICE — ELECTRODE DUAL RETURN W/ CORD - (50/PK)

## (undated) DEVICE — SUCTION INSTRUMENT YANKAUER BULBOUS TIP W/O VENT (50EA/CA)

## (undated) DEVICE — MASK ANESTHESIA ADULT  - (100/CA)

## (undated) DEVICE — TOWELS CLOTH SURGICAL - (4/PK 20PK/CA)

## (undated) DEVICE — GUIDE WIRE

## (undated) DEVICE — TUBING CLEARLINK DUO-VENT - C-FLO (48EA/CA)

## (undated) DEVICE — GLOVE BIOGEL PI INDICATOR SZ 7.5 SURGICAL PF LF -(50/BX 4BX/CA)

## (undated) DEVICE — LACTATED RINGERS INJ 1000 ML - (14EA/CA 60CA/PF)

## (undated) DEVICE — DRILL BIT

## (undated) DEVICE — CANISTER SUCTION 3000ML MECHANICAL FILTER AUTO SHUTOFF MEDI-VAC NONSTERILE LF DISP  (40EA/CA)

## (undated) DEVICE — HEAD HOLDER JUNIOR/ADULT

## (undated) DEVICE — GLOVE BIOGEL INDICATOR SZ 8 SURGICAL PF LTX - (50/BX 4BX/CA)

## (undated) DEVICE — TAPE CLOTH MEDIPORE 6 INCH - (12RL/CA)

## (undated) DEVICE — REAMING ROD

## (undated) DEVICE — DRAPE SURGICAL U 77X120 - (10/CA)

## (undated) DEVICE — PACK MAJOR ORTHO - (2EA/CA)

## (undated) DEVICE — SET EXTENSION WITH 2 PORTS (48EA/CA) ***PART #2C8610 IS A SUBSTITUTE*****

## (undated) DEVICE — DRAPE LARGE 3 QUARTER - (20/CA)

## (undated) DEVICE — DRAPE SURG STERI-DRAPE 7X11OD - (40EA/CA)

## (undated) DEVICE — WRAP COBAN SELF-ADHERENT 6 IN X  5YDS STERILE TAN (12/CA)

## (undated) DEVICE — POUCH FLUID COLLECTION INVISISHIELD - (10/BX)

## (undated) DEVICE — KIT ANESTHESIA W/CIRCUIT & 3/LT BAG W/FILTER (20EA/CA)

## (undated) DEVICE — DRAPE IOBAN II INCISE 23X17 - (10EA/BX 4BX/CA)

## (undated) DEVICE — GLOVE BIOGEL INDICATOR SZ 7.5 SURGICAL PF LTX - (50PR/BX 4BX/CA)

## (undated) DEVICE — DRESSING TRANSPARENT FILM TEGADERM 4 X 4.75" (50EA/BX)"

## (undated) DEVICE — GLOVE BIOGEL SZ 7.5 SURGICAL PF LTX - (50PR/BX 4BX/CA)

## (undated) DEVICE — GOWN WARMING STANDARD FLEX - (30/CA)

## (undated) DEVICE — KIT ROOM DECONTAMINATION

## (undated) DEVICE — DRAPE STRLE REG TOWEL 18X24 - (10/BX 4BX/CA)"